# Patient Record
Sex: MALE | Race: OTHER | HISPANIC OR LATINO | URBAN - METROPOLITAN AREA
[De-identification: names, ages, dates, MRNs, and addresses within clinical notes are randomized per-mention and may not be internally consistent; named-entity substitution may affect disease eponyms.]

---

## 2017-07-11 ENCOUNTER — INPATIENT (INPATIENT)
Facility: HOSPITAL | Age: 62
LOS: 8 days | Discharge: NURSING, REHAB OR LONG-TERM CARE | DRG: 291 | End: 2017-07-20
Attending: INTERNAL MEDICINE | Admitting: INTERNAL MEDICINE
Payer: COMMERCIAL

## 2017-07-11 VITALS
RESPIRATION RATE: 22 BRPM | HEART RATE: 143 BPM | WEIGHT: 210.98 LBS | DIASTOLIC BLOOD PRESSURE: 145 MMHG | SYSTOLIC BLOOD PRESSURE: 189 MMHG | OXYGEN SATURATION: 100 % | TEMPERATURE: 99 F

## 2017-07-11 DIAGNOSIS — N17.9 ACUTE KIDNEY FAILURE, UNSPECIFIED: ICD-10-CM

## 2017-07-11 LAB
ALBUMIN SERPL ELPH-MCNC: 4.4 G/DL — SIGNIFICANT CHANGE UP (ref 3.3–5)
ALP SERPL-CCNC: 97 U/L — SIGNIFICANT CHANGE UP (ref 40–120)
ALT FLD-CCNC: 95 U/L — HIGH (ref 10–45)
ANION GAP SERPL CALC-SCNC: 24 MMOL/L — HIGH (ref 5–17)
APTT BLD: 30.3 SEC — SIGNIFICANT CHANGE UP (ref 27.5–37.4)
AST SERPL-CCNC: 193 U/L — HIGH (ref 10–40)
BASOPHILS NFR BLD AUTO: 0.1 % — SIGNIFICANT CHANGE UP (ref 0–2)
BILIRUB SERPL-MCNC: 1.5 MG/DL — HIGH (ref 0.2–1.2)
BUN SERPL-MCNC: 19 MG/DL — SIGNIFICANT CHANGE UP (ref 7–23)
CALCIUM SERPL-MCNC: 9.5 MG/DL — SIGNIFICANT CHANGE UP (ref 8.4–10.5)
CHLORIDE SERPL-SCNC: 101 MMOL/L — SIGNIFICANT CHANGE UP (ref 96–108)
CK MB CFR SERPL CALC: 10.9 NG/ML — HIGH (ref 0–6.7)
CK SERPL-CCNC: 604 U/L — HIGH (ref 30–200)
CO2 SERPL-SCNC: 18 MMOL/L — LOW (ref 22–31)
CREAT SERPL-MCNC: 1.4 MG/DL — HIGH (ref 0.5–1.3)
ETHANOL SERPL-MCNC: <10 MG/DL — SIGNIFICANT CHANGE UP (ref 0–10)
GLUCOSE SERPL-MCNC: 198 MG/DL — HIGH (ref 70–99)
HCT VFR BLD CALC: 40 % — SIGNIFICANT CHANGE UP (ref 39–50)
HGB BLD-MCNC: 12.7 G/DL — LOW (ref 13–17)
INR BLD: 0.99 — SIGNIFICANT CHANGE UP (ref 0.88–1.16)
LIDOCAIN IGE QN: 57 U/L — SIGNIFICANT CHANGE UP (ref 7–60)
LYMPHOCYTES # BLD AUTO: 7.7 % — LOW (ref 13–44)
MAGNESIUM SERPL-MCNC: 1.4 MG/DL — LOW (ref 1.6–2.6)
MCHC RBC-ENTMCNC: 30 PG — SIGNIFICANT CHANGE UP (ref 27–34)
MCHC RBC-ENTMCNC: 31.8 G/DL — LOW (ref 32–36)
MCV RBC AUTO: 94.3 FL — SIGNIFICANT CHANGE UP (ref 80–100)
MONOCYTES NFR BLD AUTO: 9.8 % — SIGNIFICANT CHANGE UP (ref 2–14)
NEUTROPHILS NFR BLD AUTO: 82.4 % — HIGH (ref 43–77)
NT-PROBNP SERPL-SCNC: 4033 PG/ML — HIGH (ref 0–300)
PLATELET # BLD AUTO: 143 K/UL — LOW (ref 150–400)
POTASSIUM SERPL-MCNC: 5 MMOL/L — SIGNIFICANT CHANGE UP (ref 3.5–5.3)
POTASSIUM SERPL-SCNC: 5 MMOL/L — SIGNIFICANT CHANGE UP (ref 3.5–5.3)
PROT SERPL-MCNC: 8.5 G/DL — HIGH (ref 6–8.3)
PROTHROM AB SERPL-ACNC: 11 SEC — SIGNIFICANT CHANGE UP (ref 9.8–12.7)
RBC # BLD: 4.24 M/UL — SIGNIFICANT CHANGE UP (ref 4.2–5.8)
RBC # FLD: 16.7 % — SIGNIFICANT CHANGE UP (ref 10.3–16.9)
SODIUM SERPL-SCNC: 143 MMOL/L — SIGNIFICANT CHANGE UP (ref 135–145)
TROPONIN T SERPL-MCNC: 0.04 NG/ML — HIGH (ref 0–0.01)
WBC # BLD: 8 K/UL — SIGNIFICANT CHANGE UP (ref 3.8–10.5)
WBC # FLD AUTO: 8 K/UL — SIGNIFICANT CHANGE UP (ref 3.8–10.5)

## 2017-07-11 PROCEDURE — 93010 ELECTROCARDIOGRAM REPORT: CPT | Mod: 59

## 2017-07-11 PROCEDURE — 99291 CRITICAL CARE FIRST HOUR: CPT | Mod: 25

## 2017-07-11 PROCEDURE — 71010: CPT | Mod: 26

## 2017-07-11 PROCEDURE — 99291 CRITICAL CARE FIRST HOUR: CPT

## 2017-07-11 RX ORDER — METOPROLOL TARTRATE 50 MG
5 TABLET ORAL
Qty: 0 | Refills: 0 | Status: COMPLETED | OUTPATIENT
Start: 2017-07-11 | End: 2017-07-11

## 2017-07-11 RX ORDER — ONDANSETRON 8 MG/1
4 TABLET, FILM COATED ORAL ONCE
Qty: 0 | Refills: 0 | Status: COMPLETED | OUTPATIENT
Start: 2017-07-11 | End: 2017-07-11

## 2017-07-11 RX ORDER — SODIUM CHLORIDE 9 MG/ML
1000 INJECTION INTRAMUSCULAR; INTRAVENOUS; SUBCUTANEOUS ONCE
Qty: 0 | Refills: 0 | Status: COMPLETED | OUTPATIENT
Start: 2017-07-11 | End: 2017-07-11

## 2017-07-11 RX ORDER — SODIUM CHLORIDE 9 MG/ML
3 INJECTION INTRAMUSCULAR; INTRAVENOUS; SUBCUTANEOUS ONCE
Qty: 0 | Refills: 0 | Status: COMPLETED | OUTPATIENT
Start: 2017-07-11 | End: 2017-07-11

## 2017-07-11 RX ORDER — MAGNESIUM SULFATE 500 MG/ML
2 VIAL (ML) INJECTION ONCE
Qty: 0 | Refills: 0 | Status: COMPLETED | OUTPATIENT
Start: 2017-07-11 | End: 2017-07-11

## 2017-07-11 RX ORDER — ASPIRIN/CALCIUM CARB/MAGNESIUM 324 MG
325 TABLET ORAL ONCE
Qty: 0 | Refills: 0 | Status: COMPLETED | OUTPATIENT
Start: 2017-07-11 | End: 2017-07-11

## 2017-07-11 RX ORDER — SODIUM CHLORIDE 9 MG/ML
1000 INJECTION, SOLUTION INTRAVENOUS
Qty: 0 | Refills: 0 | Status: DISCONTINUED | OUTPATIENT
Start: 2017-07-11 | End: 2017-07-12

## 2017-07-11 RX ORDER — FUROSEMIDE 40 MG
40 TABLET ORAL ONCE
Qty: 0 | Refills: 0 | Status: COMPLETED | OUTPATIENT
Start: 2017-07-11 | End: 2017-07-11

## 2017-07-11 RX ADMIN — Medication 2 MILLIGRAM(S): at 20:53

## 2017-07-11 RX ADMIN — SODIUM CHLORIDE 200 MILLILITER(S): 9 INJECTION, SOLUTION INTRAVENOUS at 22:30

## 2017-07-11 RX ADMIN — ONDANSETRON 4 MILLIGRAM(S): 8 TABLET, FILM COATED ORAL at 20:57

## 2017-07-11 RX ADMIN — Medication 5 MILLIGRAM(S): at 22:10

## 2017-07-11 RX ADMIN — SODIUM CHLORIDE 200 MILLILITER(S): 9 INJECTION, SOLUTION INTRAVENOUS at 21:33

## 2017-07-11 RX ADMIN — SODIUM CHLORIDE 200 MILLILITER(S): 9 INJECTION, SOLUTION INTRAVENOUS at 21:41

## 2017-07-11 RX ADMIN — SODIUM CHLORIDE 200 MILLILITER(S): 9 INJECTION, SOLUTION INTRAVENOUS at 22:51

## 2017-07-11 RX ADMIN — SODIUM CHLORIDE 1000 MILLILITER(S): 9 INJECTION INTRAMUSCULAR; INTRAVENOUS; SUBCUTANEOUS at 20:58

## 2017-07-11 RX ADMIN — Medication 50 GRAM(S): at 22:28

## 2017-07-11 RX ADMIN — SODIUM CHLORIDE 200 MILLILITER(S): 9 INJECTION, SOLUTION INTRAVENOUS at 21:49

## 2017-07-11 RX ADMIN — SODIUM CHLORIDE 200 MILLILITER(S): 9 INJECTION, SOLUTION INTRAVENOUS at 20:54

## 2017-07-11 RX ADMIN — Medication 50 MILLIGRAM(S): at 23:49

## 2017-07-11 RX ADMIN — SODIUM CHLORIDE 3 MILLILITER(S): 9 INJECTION INTRAMUSCULAR; INTRAVENOUS; SUBCUTANEOUS at 20:54

## 2017-07-11 RX ADMIN — Medication 40 MILLIGRAM(S): at 21:35

## 2017-07-11 RX ADMIN — SODIUM CHLORIDE 200 MILLILITER(S): 9 INJECTION, SOLUTION INTRAVENOUS at 22:11

## 2017-07-11 RX ADMIN — Medication 5 MILLIGRAM(S): at 20:56

## 2017-07-11 RX ADMIN — Medication 325 MILLIGRAM(S): at 22:28

## 2017-07-11 RX ADMIN — SODIUM CHLORIDE 200 MILLILITER(S): 9 INJECTION, SOLUTION INTRAVENOUS at 22:20

## 2017-07-11 RX ADMIN — Medication 2 MILLIGRAM(S): at 21:33

## 2017-07-11 RX ADMIN — Medication 2 MILLIGRAM(S): at 23:49

## 2017-07-11 RX ADMIN — SODIUM CHLORIDE 200 MILLILITER(S): 9 INJECTION, SOLUTION INTRAVENOUS at 22:56

## 2017-07-11 NOTE — CONSULT NOTE ADULT - SUBJECTIVE AND OBJECTIVE BOX
HPI: 61 year old M poor historian w/ PMH of CHF, HTN, atrial fibrillation s/p ablation, HLD, EtOH abuse presenting to [facility] ED w/ SOB. Patient states he ran out of his medications this past Sunday and started to feel short of breath last night. He has worsening dyspnea on exertion. He also has been drinking a pint of vodka for the last 4 years daily. His last drink was last night. He denies a history of alcohol withdrawal seizures and is unsure if he has ever been intubated. He currently denies any nausea or vomiting, has some anxiety, denies hallucinations. In the ED, T 99,  (sinus) --> 107, /145 --> 137/88, RR 22, Sat 100%. In the ED, he was given 4 mg zofran, lopressor 5 mg IVP, , Mag 2 gm, lasix 40 mg, ativan 2 mg x2, and 1L NS bolus.     PMH:  CHF (EF unknown)  Atrial fibrillation  HTN  HLD    PSxH:  A fib ablation    A: none    Meds:  Lasix 80 mg daily  Lisinopril 5 mg daily  Metoprolol 200 mg daily  Amiodarone 200 mg daily    VITAL SIGNS:  Vital Signs Last 24 Hrs  T(C): 37.2 (11 Jul 2017 20:25), Max: 37.2 (11 Jul 2017 20:25)  T(F): 99 (11 Jul 2017 20:25), Max: 99 (11 Jul 2017 20:25)  HR: 116 (11 Jul 2017 22:28) (116 - 143)  BP: 137/88 (11 Jul 2017 22:19) (132/72 - 189/145)  BP(mean): --  RR: 19 (11 Jul 2017 22:19) (19 - 22)  SpO2: 100% (11 Jul 2017 22:28) (100% - 100%)    PHYSICAL EXAM:    General: obese, on BIPAP, appears comfortable, speaking in full sentences, diaphoretic  HEENT: NC/AT; no tongue fasciculations  Neck: supple  Cardiovascular: tachycardic, S1/S2; no m/r/g  Respiratory: crackles at the R base, otherwise clear  Gastrointestinal: soft, NT, distended;   Extremities: WWP; no edema, clubbing or cyanosis, mild bilateral hand tremor  Vascular: 2+ radial, DP/PT pulses b/l  Neurological: AAOx3; no focal deficits    MEDICATIONS:  MEDICATIONS  (STANDING):  sodium chloride 0.9% 1000 milliLiter(s) (200 mL/Hr) IV Continuous <Continuous>  LORazepam   Injectable 2 milliGRAM(s) IV Push Once  chlordiazePOXIDE 50 milliGRAM(s) Oral Once      LABS:                        12.7   8.0   )-----------( 143      ( 11 Jul 2017 20:58 )             40.0     07-11    143  |  101  |  19  ----------------------------<  198<H>  5.0   |  18<L>  |  1.40<H>    Ca    9.5      11 Jul 2017 20:58  Mg     1.4     07-11    TPro  8.5<H>  /  Alb  4.4  /  TBili  1.5<H>  /  DBili  x   /  AST  193<H>  /  ALT  95<H>  /  AlkPhos  97  07-11    PT/INR - ( 11 Jul 2017 20:58 )   PT: 11.0 sec;   INR: 0.99          PTT - ( 11 Jul 2017 20:58 )  PTT:30.3 sec    CAPILLARY BLOOD GLUCOSE          RADIOLOGY & ADDITIONAL TESTS: Reviewed.    ASSESSMENT:  61 year old M poor historian w/ PMH of CHF, HTN, atrial fibrillation s/p ablation, HLD, EtOH abuse presenting to [facility] ED w/ SOB and alcohol withdrawal.     PLAN:     1. Alcohol withdrawal: CIWA of 6 (diaphoretic, anxiety, s/p ativan 2 mg x2, can start with librium 75 mg and valium 10 mg IVP  -continue to monitor CIWA  -has never been intubated    2. Tachycardia: w/ sinus rhythm on EKG  -etiology likely secondary to alcohol withdrawal  -HR has come down to 100s from 143 since admission  -tachycardia will likely resolve with control of alcohol withdrawal    3. SOB: likely secondary to CHF exacerbation  -s/p lasix 40 mg in ED w/ good urine output  -currently on BIPAP  -with elevated BNP as well  -will need to obtain more collateral in AM    Dispo: HPI: 61 year old M poor historian w/ PMH of CHF, HTN, atrial fibrillation s/p ablation, HLD, EtOH abuse presenting to [facility] ED w/ SOB. Patient states he ran out of his medications this past Sunday and started to feel short of breath last night. He has worsening dyspnea on exertion. He also has been drinking a pint of vodka for the last 4 years daily. His last drink was last night. He denies a history of alcohol withdrawal seizures and is unsure if he has ever been intubated. He currently denies any nausea or vomiting, has some anxiety, denies hallucinations. In the ED, T 99,  (sinus) --> 107, /145 --> 137/88, RR 22, Sat 100%. In the ED, he was given 4 mg zofran, lopressor 5 mg IVP, , Mag 2 gm, lasix 40 mg, ativan 2 mg x2, and 1L NS bolus.     PMH:  CHF (EF unknown)  Atrial fibrillation  HTN  HLD    PSxH:  A fib ablation    A: none    Meds:  Lasix 80 mg daily  Lisinopril 5 mg daily  Metoprolol 200 mg daily  Amiodarone 200 mg daily    VITAL SIGNS:  Vital Signs Last 24 Hrs  T(C): 37.2 (11 Jul 2017 20:25), Max: 37.2 (11 Jul 2017 20:25)  T(F): 99 (11 Jul 2017 20:25), Max: 99 (11 Jul 2017 20:25)  HR: 116 (11 Jul 2017 22:28) (116 - 143)  BP: 137/88 (11 Jul 2017 22:19) (132/72 - 189/145)  BP(mean): --  RR: 19 (11 Jul 2017 22:19) (19 - 22)  SpO2: 100% (11 Jul 2017 22:28) (100% - 100%)    PHYSICAL EXAM:    General: obese, on BIPAP, appears comfortable, speaking in full sentences, diaphoretic  HEENT: NC/AT; no tongue fasciculations  Neck: supple  Cardiovascular: tachycardic, S1/S2; no m/r/g  Respiratory: crackles at the R base, otherwise clear  Gastrointestinal: soft, NT, distended;   Extremities: WWP; no edema, clubbing or cyanosis, mild bilateral hand tremor  Vascular: 2+ radial, DP/PT pulses b/l  Neurological: AAOx3; no focal deficits    MEDICATIONS:  MEDICATIONS  (STANDING):  sodium chloride 0.9% 1000 milliLiter(s) (200 mL/Hr) IV Continuous <Continuous>  LORazepam   Injectable 2 milliGRAM(s) IV Push Once  chlordiazePOXIDE 50 milliGRAM(s) Oral Once      LABS:                        12.7   8.0   )-----------( 143      ( 11 Jul 2017 20:58 )             40.0     07-11    143  |  101  |  19  ----------------------------<  198<H>  5.0   |  18<L>  |  1.40<H>    Ca    9.5      11 Jul 2017 20:58  Mg     1.4     07-11    TPro  8.5<H>  /  Alb  4.4  /  TBili  1.5<H>  /  DBili  x   /  AST  193<H>  /  ALT  95<H>  /  AlkPhos  97  07-11    PT/INR - ( 11 Jul 2017 20:58 )   PT: 11.0 sec;   INR: 0.99          PTT - ( 11 Jul 2017 20:58 )  PTT:30.3 sec    CAPILLARY BLOOD GLUCOSE          RADIOLOGY & ADDITIONAL TESTS: Reviewed.    ASSESSMENT:  61 year old M poor historian w/ PMH of CHF, HTN, atrial fibrillation s/p ablation, HLD, EtOH abuse presenting to [facility] ED w/ SOB and alcohol withdrawal.     PLAN:     1. Alcohol withdrawal: CIWA of 6 (diaphoretic, anxiety, s/p ativan 2 mg x2, can start with librium 75 mg and valium 10 mg IVP  -continue to monitor CIWA  -has never been intubated    2. Tachycardia: w/ sinus rhythm on EKG  -etiology likely secondary to alcohol withdrawal  -HR has come down to 100s from 143 since admission  -tachycardia will likely resolve with control of alcohol withdrawal    3. SOB: likely secondary to CHF exacerbation  -s/p lasix 40 mg in ED w/ good urine output  -currently on BIPAP  -with elevated BNP as well  -will need to obtain more collateral in AM    4. Anion gap metabolic acidosis: w/ Cr 1.4, unclear baseline  -would check lactate    5. Elevated troponin:  -EKG w/ sinus rhythm  -no prior EKG  -would trend troponins  -likely demand from alcohol withdrawal and possible CHF exacerbation    Dispo: HPI: 61 year old M poor historian w/ PMH of CHF, HTN, atrial fibrillation s/p ablation, HLD, EtOH abuse presenting to [facility] ED w/ SOB. Patient states he ran out of his medications this past Sunday and started to feel short of breath last night. He has worsening dyspnea on exertion. He also has been drinking a pint of vodka for the last 4 years daily. His last drink was last night. He denies a history of alcohol withdrawal seizures and is unsure if he has ever been intubated. He currently denies any nausea or vomiting, has some anxiety, denies hallucinations. In the ED, T 99,  (sinus) --> 107, /145 --> 137/88, RR 22, Sat 100%. In the ED, he was given 4 mg zofran, lopressor 5 mg IVP, , Mag 2 gm, lasix 40 mg, ativan 2 mg x2, and 1L NS bolus.     PMH:  CHF (EF unknown)  Atrial fibrillation  HTN  HLD    PSxH:  A fib ablation    A: none    Meds:  Lasix 80 mg daily  Lisinopril 5 mg daily  Metoprolol 200 mg daily  Amiodarone 200 mg daily    VITAL SIGNS:  Vital Signs Last 24 Hrs  T(C): 37.2 (11 Jul 2017 20:25), Max: 37.2 (11 Jul 2017 20:25)  T(F): 99 (11 Jul 2017 20:25), Max: 99 (11 Jul 2017 20:25)  HR: 116 (11 Jul 2017 22:28) (116 - 143)  BP: 137/88 (11 Jul 2017 22:19) (132/72 - 189/145)  BP(mean): --  RR: 19 (11 Jul 2017 22:19) (19 - 22)  SpO2: 100% (11 Jul 2017 22:28) (100% - 100%)    PHYSICAL EXAM:    General: obese, on BIPAP, appears comfortable, speaking in full sentences, diaphoretic  HEENT: NC/AT; no tongue fasciculations  Neck: supple  Cardiovascular: tachycardic, S1/S2; no m/r/g  Respiratory: crackles at the R base, otherwise clear  Gastrointestinal: soft, NT, distended;   Extremities: WWP; no edema, clubbing or cyanosis, mild bilateral hand tremor  Vascular: 2+ radial, DP/PT pulses b/l  Neurological: AAOx3; no focal deficits    MEDICATIONS:  MEDICATIONS  (STANDING):  sodium chloride 0.9% 1000 milliLiter(s) (200 mL/Hr) IV Continuous <Continuous>  LORazepam   Injectable 2 milliGRAM(s) IV Push Once  chlordiazePOXIDE 50 milliGRAM(s) Oral Once      LABS:                        12.7   8.0   )-----------( 143      ( 11 Jul 2017 20:58 )             40.0     07-11    143  |  101  |  19  ----------------------------<  198<H>  5.0   |  18<L>  |  1.40<H>    Ca    9.5      11 Jul 2017 20:58  Mg     1.4     07-11    TPro  8.5<H>  /  Alb  4.4  /  TBili  1.5<H>  /  DBili  x   /  AST  193<H>  /  ALT  95<H>  /  AlkPhos  97  07-11    PT/INR - ( 11 Jul 2017 20:58 )   PT: 11.0 sec;   INR: 0.99          PTT - ( 11 Jul 2017 20:58 )  PTT:30.3 sec    CAPILLARY BLOOD GLUCOSE          RADIOLOGY & ADDITIONAL TESTS: Reviewed.    ASSESSMENT:  61 year old M poor historian w/ PMH of CHF, HTN, atrial fibrillation s/p ablation, HLD, EtOH abuse presenting to [facility] ED w/ SOB and alcohol withdrawal.     PLAN:     1. Alcohol withdrawal: CIWA of 6 (diaphoretic, anxiety, s/p ativan 2 mg x2, can start with librium 75 mg and valium 10 mg IVP  -continue to monitor CIWA  -has never been intubated  -c/w librium and prn ativan    2. Tachycardia: w/ sinus rhythm on EKG  -etiology likely secondary to alcohol withdrawal  -HR has come down to 100s from 143 since admission  -tachycardia will likely resolve with control of alcohol withdrawal  -repeat EKG on arrival to [facility] to r/o NSTEMI    3. SOB: likely secondary to CHF exacerbation  -s/p lasix 40 mg in ED w/ good urine output  -currently on BIPAP  -with elevated BNP as well  -will need to obtain more collateral in AM    4. Anion gap metabolic acidosis: w/ Cr 1.4, unclear baseline  -would check lactate    5. Elevated troponin:  -EKG w/ sinus rhythm, repeat EKG upon arrival to [facility] to r/o NSTEMI  -no prior EKG  -would trend troponins      Dispo: Admit to [facility]

## 2017-07-11 NOTE — ED PROVIDER NOTE - NEUROLOGICAL, MLM
Alert and oriented, no focal deficits, no motor or sensory deficits.  tremor of arms when extended, tongue fasiculations

## 2017-07-11 NOTE — ED PROVIDER NOTE - CONSTITUTIONAL, MLM
normal... diaphoretic, ill appearing, well nourished, awake, alert, oriented to person, place, time/situation and in moderate apparent distress.

## 2017-07-11 NOTE — ED PROVIDER NOTE - MEDICAL DECISION MAKING DETAILS
diaphoretic, tachycardic, hypertensive, tremulous.  concern for alcohol withdrawal with overlaping symptoms of htn urgency/chf due to med non compliance.  given ativan 2mg iv push x3, metoprolol 5mg iv, iv fluid bolus.  initially did not appear volume overloaded but after labs returned with bnp > 4000 and history of chf, concern for worsening respiratory distress secondary from fluid overload.  given lasix and started bipap with improvement.  icu consulted.  initial troponin mildly elevated.  discussed starting heparin with icu attending Dr. Maciel who would like to trend troponin prior to starting.  also requested additional 75mg librium, 10mg valium.  admit to [facility]

## 2017-07-11 NOTE — ED PROVIDER NOTE - OBJECTIVE STATEMENT
here with shakiness, nausea, shortness of breath, fast hr.  States he ran out of his medicines a few days ago (furosemide 80mg, lisinopril 5mg, metoprolol er 200, amiodarone 200mg).  Has been drinking daily a pint of vodka, last drink yesterday.  Denies prior alcohol withdrawal or dt's/ seizure.  No chest pain, fever, vomiting.

## 2017-07-11 NOTE — ED PROVIDER NOTE - CARE PLAN
Principal Discharge DX:	Alcohol withdrawal  Secondary Diagnosis:	Elevated troponin  Secondary Diagnosis:	Shortness of breath

## 2017-07-11 NOTE — ED ADULT NURSE NOTE - OBJECTIVE STATEMENT
pt brought self to er with c/o SOB that gets worst with walking. pt stated that he also drinks Alcohol with last drink  last night and he normally drink 1 pint of Vodka daily. pt was noted to be very diaphoretic in er and extensive shakiness. able to maintain full sentences and answer all questions asked

## 2017-07-11 NOTE — ED PROVIDER NOTE - PMH
Congestive heart failure  CHF (congestive heart failure)  Essential hypertension  HTN (hypertension)

## 2017-07-11 NOTE — ED ADULT TRIAGE NOTE - CHIEF COMPLAINT QUOTE
pt complaining of SOB since this afternoon with "shaking". Pt hypertensive and tachycardiac in triage, denies CP. EKG in prog

## 2017-07-12 DIAGNOSIS — E78.5 HYPERLIPIDEMIA, UNSPECIFIED: ICD-10-CM

## 2017-07-12 DIAGNOSIS — R06.02 SHORTNESS OF BREATH: ICD-10-CM

## 2017-07-12 DIAGNOSIS — Z29.9 ENCOUNTER FOR PROPHYLACTIC MEASURES, UNSPECIFIED: ICD-10-CM

## 2017-07-12 DIAGNOSIS — I48.91 UNSPECIFIED ATRIAL FIBRILLATION: ICD-10-CM

## 2017-07-12 DIAGNOSIS — I50.9 HEART FAILURE, UNSPECIFIED: ICD-10-CM

## 2017-07-12 DIAGNOSIS — F10.239 ALCOHOL DEPENDENCE WITH WITHDRAWAL, UNSPECIFIED: ICD-10-CM

## 2017-07-12 DIAGNOSIS — R63.8 OTHER SYMPTOMS AND SIGNS CONCERNING FOOD AND FLUID INTAKE: ICD-10-CM

## 2017-07-12 DIAGNOSIS — R74.8 ABNORMAL LEVELS OF OTHER SERUM ENZYMES: ICD-10-CM

## 2017-07-12 DIAGNOSIS — I48.92 UNSPECIFIED ATRIAL FLUTTER: ICD-10-CM

## 2017-07-12 DIAGNOSIS — E87.2 ACIDOSIS: ICD-10-CM

## 2017-07-12 DIAGNOSIS — I10 ESSENTIAL (PRIMARY) HYPERTENSION: ICD-10-CM

## 2017-07-12 LAB
ALBUMIN SERPL ELPH-MCNC: 4 G/DL — SIGNIFICANT CHANGE UP (ref 3.3–5)
ALP SERPL-CCNC: 79 U/L — SIGNIFICANT CHANGE UP (ref 40–120)
ALT FLD-CCNC: 73 U/L — HIGH (ref 10–45)
ANION GAP SERPL CALC-SCNC: 19 MMOL/L — HIGH (ref 5–17)
ANION GAP SERPL CALC-SCNC: 22 MMOL/L — HIGH (ref 5–17)
APPEARANCE UR: CLEAR — SIGNIFICANT CHANGE UP
APTT BLD: 27.4 SEC — LOW (ref 27.5–37.4)
AST SERPL-CCNC: 98 U/L — HIGH (ref 10–40)
BACTERIA # UR AUTO: PRESENT /HPF
BILIRUB SERPL-MCNC: 1.3 MG/DL — HIGH (ref 0.2–1.2)
BILIRUB UR-MCNC: (no result)
BUN SERPL-MCNC: 18 MG/DL — SIGNIFICANT CHANGE UP (ref 7–23)
BUN SERPL-MCNC: 22 MG/DL — SIGNIFICANT CHANGE UP (ref 7–23)
CALCIUM SERPL-MCNC: 8.7 MG/DL — SIGNIFICANT CHANGE UP (ref 8.4–10.5)
CALCIUM SERPL-MCNC: 9.3 MG/DL — SIGNIFICANT CHANGE UP (ref 8.4–10.5)
CHLORIDE SERPL-SCNC: 105 MMOL/L — SIGNIFICANT CHANGE UP (ref 96–108)
CHLORIDE SERPL-SCNC: 105 MMOL/L — SIGNIFICANT CHANGE UP (ref 96–108)
CK MB CFR SERPL CALC: 8.3 NG/ML — HIGH (ref 0–6.7)
CK SERPL-CCNC: 456 U/L — HIGH (ref 30–200)
CO2 SERPL-SCNC: 16 MMOL/L — LOW (ref 22–31)
CO2 SERPL-SCNC: 19 MMOL/L — LOW (ref 22–31)
COLOR SPEC: YELLOW — SIGNIFICANT CHANGE UP
COMMENT - URINE: SIGNIFICANT CHANGE UP
CREAT ?TM UR-MCNC: 151 MG/DL — SIGNIFICANT CHANGE UP
CREAT SERPL-MCNC: 1.3 MG/DL — SIGNIFICANT CHANGE UP (ref 0.5–1.3)
CREAT SERPL-MCNC: 1.4 MG/DL — HIGH (ref 0.5–1.3)
DIFF PNL FLD: (no result)
EPI CELLS # UR: SIGNIFICANT CHANGE UP /HPF
GAS PNL BLDA: SIGNIFICANT CHANGE UP
GLUCOSE SERPL-MCNC: 101 MG/DL — HIGH (ref 70–99)
GLUCOSE SERPL-MCNC: 111 MG/DL — HIGH (ref 70–99)
GLUCOSE UR QL: NEGATIVE — SIGNIFICANT CHANGE UP
HCT VFR BLD CALC: 36.1 % — LOW (ref 39–50)
HGB BLD-MCNC: 11.7 G/DL — LOW (ref 13–17)
INR BLD: 1.04 — SIGNIFICANT CHANGE UP (ref 0.88–1.16)
KETONES UR-MCNC: NEGATIVE — SIGNIFICANT CHANGE UP
LACTATE SERPL-SCNC: 1.1 MMOL/L — SIGNIFICANT CHANGE UP (ref 0.5–2)
LEUKOCYTE ESTERASE UR-ACNC: NEGATIVE — SIGNIFICANT CHANGE UP
MAGNESIUM SERPL-MCNC: 3 MG/DL — HIGH (ref 1.6–2.6)
MCHC RBC-ENTMCNC: 31 PG — SIGNIFICANT CHANGE UP (ref 27–34)
MCHC RBC-ENTMCNC: 32.4 G/DL — SIGNIFICANT CHANGE UP (ref 32–36)
MCV RBC AUTO: 95.5 FL — SIGNIFICANT CHANGE UP (ref 80–100)
NITRITE UR-MCNC: NEGATIVE — SIGNIFICANT CHANGE UP
PCO2 BLDA: SIGNIFICANT CHANGE UP MMHG (ref 35–48)
PH BLDA: SIGNIFICANT CHANGE UP (ref 7.35–7.45)
PH UR: 5.5 — SIGNIFICANT CHANGE UP (ref 5–8)
PHOSPHATE SERPL-MCNC: 3.2 MG/DL — SIGNIFICANT CHANGE UP (ref 2.5–4.5)
PLATELET # BLD AUTO: 114 K/UL — LOW (ref 150–400)
PO2 BLDA: SIGNIFICANT CHANGE UP MMHG (ref 83–108)
POTASSIUM SERPL-MCNC: 4 MMOL/L — SIGNIFICANT CHANGE UP (ref 3.5–5.3)
POTASSIUM SERPL-MCNC: 4.3 MMOL/L — SIGNIFICANT CHANGE UP (ref 3.5–5.3)
POTASSIUM SERPL-SCNC: 4 MMOL/L — SIGNIFICANT CHANGE UP (ref 3.5–5.3)
POTASSIUM SERPL-SCNC: 4.3 MMOL/L — SIGNIFICANT CHANGE UP (ref 3.5–5.3)
PROT SERPL-MCNC: 7.1 G/DL — SIGNIFICANT CHANGE UP (ref 6–8.3)
PROT UR-MCNC: 30 MG/DL
PROTHROM AB SERPL-ACNC: 11.5 SEC — SIGNIFICANT CHANGE UP (ref 9.8–12.7)
RBC # BLD: 3.78 M/UL — LOW (ref 4.2–5.8)
RBC # FLD: 17.1 % — HIGH (ref 10.3–16.9)
RBC CASTS # UR COMP ASSIST: > 10 /HPF
SAO2 % BLDA: SIGNIFICANT CHANGE UP % (ref 95–100)
SODIUM SERPL-SCNC: 143 MMOL/L — SIGNIFICANT CHANGE UP (ref 135–145)
SODIUM SERPL-SCNC: 143 MMOL/L — SIGNIFICANT CHANGE UP (ref 135–145)
SP GR SPEC: 1.02 — SIGNIFICANT CHANGE UP (ref 1–1.03)
TROPONIN T SERPL-MCNC: 0.03 NG/ML — HIGH (ref 0–0.01)
UROBILINOGEN FLD QL: 1 E.U./DL — SIGNIFICANT CHANGE UP
UUN UR-MCNC: 396 MG/DL — SIGNIFICANT CHANGE UP
WBC # BLD: 5.8 K/UL — SIGNIFICANT CHANGE UP (ref 3.8–10.5)
WBC # FLD AUTO: 5.8 K/UL — SIGNIFICANT CHANGE UP (ref 3.8–10.5)
WBC UR QL: < 5 /HPF — SIGNIFICANT CHANGE UP

## 2017-07-12 PROCEDURE — 99222 1ST HOSP IP/OBS MODERATE 55: CPT | Mod: GC

## 2017-07-12 PROCEDURE — 93010 ELECTROCARDIOGRAM REPORT: CPT

## 2017-07-12 RX ORDER — METOPROLOL TARTRATE 50 MG
5 TABLET ORAL ONCE
Qty: 0 | Refills: 0 | Status: COMPLETED | OUTPATIENT
Start: 2017-07-12 | End: 2017-07-12

## 2017-07-12 RX ORDER — FUROSEMIDE 40 MG
80 TABLET ORAL DAILY
Qty: 0 | Refills: 0 | Status: DISCONTINUED | OUTPATIENT
Start: 2017-07-12 | End: 2017-07-12

## 2017-07-12 RX ORDER — AMIODARONE HYDROCHLORIDE 400 MG/1
200 TABLET ORAL
Qty: 0 | Refills: 0 | Status: DISCONTINUED | OUTPATIENT
Start: 2017-07-12 | End: 2017-07-13

## 2017-07-12 RX ORDER — DIGOXIN 250 MCG
0.25 TABLET ORAL EVERY 6 HOURS
Qty: 0 | Refills: 0 | Status: DISCONTINUED | OUTPATIENT
Start: 2017-07-13 | End: 2017-07-13

## 2017-07-12 RX ORDER — SODIUM CHLORIDE 9 MG/ML
1000 INJECTION, SOLUTION INTRAVENOUS ONCE
Qty: 0 | Refills: 0 | Status: DISCONTINUED | OUTPATIENT
Start: 2017-07-12 | End: 2017-07-12

## 2017-07-12 RX ORDER — RIVAROXABAN 15 MG-20MG
20 KIT ORAL DAILY
Qty: 0 | Refills: 0 | Status: DISCONTINUED | OUTPATIENT
Start: 2017-07-12 | End: 2017-07-16

## 2017-07-12 RX ORDER — FUROSEMIDE 40 MG
40 TABLET ORAL
Qty: 0 | Refills: 0 | Status: DISCONTINUED | OUTPATIENT
Start: 2017-07-13 | End: 2017-07-15

## 2017-07-12 RX ORDER — LISINOPRIL 2.5 MG/1
5 TABLET ORAL DAILY
Qty: 0 | Refills: 0 | Status: DISCONTINUED | OUTPATIENT
Start: 2017-07-12 | End: 2017-07-14

## 2017-07-12 RX ORDER — FUROSEMIDE 40 MG
40 TABLET ORAL ONCE
Qty: 0 | Refills: 0 | Status: COMPLETED | OUTPATIENT
Start: 2017-07-12 | End: 2017-07-12

## 2017-07-12 RX ORDER — AMIODARONE HYDROCHLORIDE 400 MG/1
200 TABLET ORAL DAILY
Qty: 0 | Refills: 0 | Status: DISCONTINUED | OUTPATIENT
Start: 2017-07-12 | End: 2017-07-12

## 2017-07-12 RX ORDER — METOPROLOL TARTRATE 50 MG
200 TABLET ORAL DAILY
Qty: 0 | Refills: 0 | Status: DISCONTINUED | OUTPATIENT
Start: 2017-07-12 | End: 2017-07-13

## 2017-07-12 RX ORDER — RIVAROXABAN 15 MG-20MG
20 KIT ORAL DAILY
Qty: 0 | Refills: 0 | Status: DISCONTINUED | OUTPATIENT
Start: 2017-07-12 | End: 2017-07-12

## 2017-07-12 RX ORDER — MAGNESIUM SULFATE 500 MG/ML
4 VIAL (ML) INJECTION ONCE
Qty: 0 | Refills: 0 | Status: COMPLETED | OUTPATIENT
Start: 2017-07-12 | End: 2017-07-12

## 2017-07-12 RX ORDER — DIGOXIN 250 MCG
0.5 TABLET ORAL ONCE
Qty: 0 | Refills: 0 | Status: COMPLETED | OUTPATIENT
Start: 2017-07-12 | End: 2017-07-12

## 2017-07-12 RX ADMIN — Medication 75 MILLIGRAM(S): at 21:18

## 2017-07-12 RX ADMIN — Medication 75 MILLIGRAM(S): at 09:19

## 2017-07-12 RX ADMIN — AMIODARONE HYDROCHLORIDE 200 MILLIGRAM(S): 400 TABLET ORAL at 06:53

## 2017-07-12 RX ADMIN — Medication 2 MILLIGRAM(S): at 19:07

## 2017-07-12 RX ADMIN — Medication 75 MILLIGRAM(S): at 03:24

## 2017-07-12 RX ADMIN — Medication 5 MILLIGRAM(S): at 21:33

## 2017-07-12 RX ADMIN — Medication 2 MILLIGRAM(S): at 23:11

## 2017-07-12 RX ADMIN — Medication 100 GRAM(S): at 09:11

## 2017-07-12 RX ADMIN — RIVAROXABAN 20 MILLIGRAM(S): KIT at 17:07

## 2017-07-12 RX ADMIN — LISINOPRIL 5 MILLIGRAM(S): 2.5 TABLET ORAL at 10:42

## 2017-07-12 RX ADMIN — Medication 2 MILLIGRAM(S): at 20:44

## 2017-07-12 RX ADMIN — Medication 2 MILLIGRAM(S): at 13:00

## 2017-07-12 RX ADMIN — Medication 200 MILLIGRAM(S): at 04:27

## 2017-07-12 RX ADMIN — Medication 5 MILLIGRAM(S): at 20:39

## 2017-07-12 RX ADMIN — Medication 75 MILLIGRAM(S): at 17:40

## 2017-07-12 RX ADMIN — Medication 208 MILLIGRAM(S): at 23:08

## 2017-07-12 RX ADMIN — AMIODARONE HYDROCHLORIDE 200 MILLIGRAM(S): 400 TABLET ORAL at 19:06

## 2017-07-12 RX ADMIN — Medication 80 MILLIGRAM(S): at 17:07

## 2017-07-12 RX ADMIN — SODIUM CHLORIDE 200 MILLILITER(S): 9 INJECTION, SOLUTION INTRAVENOUS at 01:03

## 2017-07-12 RX ADMIN — Medication 5 MILLIGRAM(S): at 21:18

## 2017-07-12 RX ADMIN — Medication 40 MILLIGRAM(S): at 19:05

## 2017-07-12 NOTE — H&P ADULT - PROBLEM SELECTOR PLAN 1
CIWA of 15 s/p ativan 2 mg x3, librium 50 mg and valium 10 mg IV.  Continue to monitor CIWA.  Librium 75mg q6 hours, Ativan 4mg q4 PRN.

## 2017-07-12 NOTE — H&P ADULT - PMH
Atrial fibrillation, unspecified type    Congestive heart failure  CHF (congestive heart failure)  Essential hypertension  HTN (hypertension)  HLD (hyperlipidemia)

## 2017-07-12 NOTE — PROGRESS NOTE ADULT - PROBLEM SELECTOR PLAN 4
CIWA of 15 s/p ativan 2 mg x3, librium 50 mg and valium 10 mg IV.  Continue to monitor CIWA.  Librium 75mg q6 hours, Ativan 4mg q4 PRN. CIWA of 15 s/p ativan 2 mg x3, librium 50 mg and valium 10 mg IV. Anxiety and symptoms may be from cardiac issues.   -Continue to monitor CIWA.    -Librium 75mg q8 hours, Ativan 2mg q4 PRN. Anxiety and symptoms may be from cardiac issues.   -Continue to monitor CIWA.    -Librium 75mg q8 hours, Ativan 2mg q4 PRN.

## 2017-07-12 NOTE — H&P ADULT - PROBLEM SELECTOR PLAN 3
Troponin elevated at 0.04 most likely due supply demand mismatch caused by afib with RVR, less likely non-ST elevation MI.  EKG WNL. Will trend troponin.

## 2017-07-12 NOTE — H&P ADULT - PROBLEM SELECTOR PLAN 4
Pt with a history of afib ?s/p ablation presented with sinus tachycardia but found on exam to have an irregularly irregular rhythm.  S/p Lopressor 10mg for HR control.  Continue home metoprolol 200mg.  Continue home amiodarone 200 mg. CHADsVASC2 score is 2, suggesting moderate-high risk. Consider starting anticoagulation. Anion gap metabolic acidosis possibly secondary to EtOH ketoacidosis vs elevated lactate.  f/u Lactate  f/u ABG

## 2017-07-12 NOTE — H&P ADULT - PROBLEM SELECTOR PLAN 2
Shortness of breath secondary to afib with RVR vs. CHF exacerbation (elevated BNP.) S/p Lasix 40 mg in ED w/ good urine output. PE unlikely (Wells score <4, PE unlikely).  Pneumonia less likely as there is no leukocytosis, fever, or suggestive exam findings. CXR negative for any acute pathology.  F/u ABG for Shortness of breath secondary to afib with RVR vs. CHF exacerbation (elevated BNP.) S/p Lasix 40 mg in ED w/ good urine output. PE unlikely (Wells score <4, PE unlikely).  Pneumonia less likely as there is no leukocytosis, fever, or suggestive exam findings. CXR negative for any acute pathology.  f/u ABG

## 2017-07-12 NOTE — PROGRESS NOTE ADULT - PROBLEM SELECTOR PLAN 7
Hold lisinopril due to mild ADAM Cr 1.4>>1.3. Pt normotensive. Consider restarting in AM. restarted lisinopril 5mg

## 2017-07-12 NOTE — PROGRESS NOTE ADULT - PROBLEM SELECTOR PLAN 2
History of CHF, EF unknown. BNP 4033.  Echo today. Repeat EKG. History of CHF, EF unknown. BNP 4033.   -Echo today  -Repeat EKG. managed by EP Dr. Kristofer Roberts, will come see patient today  -holding Xarelto 200mg for anticoagulation until Dr. Roberts sees patient today managed by EP Dr. Kristofer Roberts, will come see patient today  -holding Xarelto 200mg for anticoagulation until Dr. Roberts sees patient today  -repeat EKG this morning showed a fib in 110's with RVR managed by EP Dr. Kristofer Roberts, will come see patient today  -Xarelto 200mg for anticoagulation per Dr. Sheth  -repeat EKG this morning showed a fib in 110's with RVR managed by EP Dr. Kristofer Roberts, will come see patient today  -amiodarone 200mg  -Xarelto 200mg for anticoagulation per Dr. Sheth  -repeat EKG this morning showed a fib in 110's with RVR, another ekg this evening

## 2017-07-12 NOTE — PROGRESS NOTE ADULT - PROBLEM SELECTOR PLAN 6
Pt with a history of afib ?s/p ablation presented with sinus tachycardia but found on exam to have an irregularly irregular rhythm.  S/p Lopressor 10mg for HR control.  Continue home metoprolol 200mg.  Continue home amiodarone 200 mg. CHADsVASC2 score is 2, suggesting moderate-high risk. Consider starting anticoagulation. Anion gap metabolic acidosis possibly secondary to EtOH ketoacidosis vs elevated lactate.  -lactate 1.1   -consider ABG

## 2017-07-12 NOTE — H&P ADULT - HISTORY OF PRESENT ILLNESS
61 year old M poor historian w/ PMH of CHF, HTN, atrial fibrillation s/p ablation, HLD, EtOH abuse presenting to [facility] ED w/ SOB. Patient states he ran out of his medications this past Sunday and started to feel short of breath last night. He has worsening dyspnea on exertion. He also has been drinking a pint of vodka for the last 4 years daily. His last drink was last night. He denies a history of alcohol withdrawal seizures and is unsure if he has ever been intubated. He currently denies any nausea or vomiting, has some anxiety, denies hallucinations.     In the ED, T 99,  (sinus) --> 107, /145 --> 137/88, RR 22, Sat 100%. In the ED, he was given 4 mg zofran, lopressor 5 mg IVP, , Mag 2 gm, lasix 40 mg, ativan 2 mg x2, and 1L NS bolus. 61 year old M w/ PMH of CHF, HTN, atrial fibrillation s/p ablation, HLD, EtOH abuse presented to [facility] ED w/ SOB. Patient states that he ran out of his medications this past Sunday and started to feel short of breath last night. He complains of worsening dyspnea on exertion, stating that he used to be able to walk 20-30 yards before becoming SOB and not tires out much sooner. He had a similar episode of SOB in February for which he was seen at [facility] and diagnosed with a heart failure exacerbation. He also has been drinking a pint of vodka for the last 4 years daily. His last drink was 8PM Monday evening. He denies a history of alcohol withdrawal seizures and is unsure if he has ever been intubated. He currently denies any CP, nausea, vomiting, abd pain, headache, or hallucinations, but states that he feels very anxious.     In the ED, Tmax 99,  - 143 (sinus) 107, /77 - 189/145, RR 18 - 22, Sat 97% - 100%. In the ED, he was given 4 mg Zofran, Lopressor 5 mg IVP, , Mag 2 gm, Lasix 40 mg, Aivan 2 mg x3, and 1L NS banana bag x2, Librium 50mg and Valium 10mg.

## 2017-07-12 NOTE — H&P ADULT - PROBLEM SELECTOR PLAN 5
History of CHF, EF unknown. BNP 4033. Pt with a history of afib ?s/p ablation presented with sinus tachycardia but found on exam to have an irregularly irregular rhythm.  S/p Lopressor 10mg for HR control.  Continue home metoprolol 200mg.  Continue home amiodarone 200 mg. CHADsVASC2 score is 2, suggesting moderate-high risk. Consider starting anticoagulation.

## 2017-07-12 NOTE — H&P ADULT - NSHPPHYSICALEXAM_GEN_ALL_CORE
VITAL SIGNS:  T(C): 37 (07-12-17 @ 00:14), Max: 37.2 (07-11-17 @ 20:25)  T(F): 98.6 (07-12-17 @ 00:14), Max: 99 (07-11-17 @ 20:25)  HR: 100 (07-12-17 @ 02:14) (100 - 143)  BP: 118/77 (07-12-17 @ 02:14) (118/77 - 189/145)  BP(mean): --  RR: 18 (07-12-17 @ 02:14) (18 - 22)  SpO2: 97% (07-12-17 @ 02:14) (97% - 100%)  Wt(kg): --    PHYSICAL EXAM:    Constitutional: obese male with poor hygiene, diaphoretic and mildly distressed  Head: NC/AT  Eyes: PERRL, EOMI, clear conjunctiva  ENT: no nasal discharge; uvula midline, no oropharyngeal erythema or exudates; MMM  Neck: supple; no JVD or thyromegaly  Respiratory: CTA B/L; no W/R/R, no retractions  Cardiac: +S1/S2; irregular rhythm; no M/R/G  Gastrointestinal: soft, non-tender; no rebound or guarding; +BSx4  Back: spine midline, no bony tenderness  Extremities: WWP, no clubbing or cyanosis; trace   Musculoskeletal: NROM x4; no joint swelling, tenderness or erythema  Vascular: 2+ radial, femoral, DP/PT pulses B/L  Dermatologic: skin warm, dry and intact; no rashes, wounds, or scars  Lymphatic: no submandibular or cervical LAD  Neurologic: AAOx3; CNII-XII grossly intact; no focal deficits  Psychiatric: affect and characteristics of appearance, verbalizations, behaviors are appropriate VITAL SIGNS:  T(C): 37 (07-12-17 @ 00:14), Max: 37.2 (07-11-17 @ 20:25)  T(F): 98.6 (07-12-17 @ 00:14), Max: 99 (07-11-17 @ 20:25)  HR: 100 (07-12-17 @ 02:14) (100 - 143)  BP: 118/77 (07-12-17 @ 02:14) (118/77 - 189/145)  BP(mean): --  RR: 18 (07-12-17 @ 02:14) (18 - 22)  SpO2: 97% (07-12-17 @ 02:14) (97% - 100%)  Wt(kg): --    PHYSICAL EXAM:    Constitutional: obese male with poor hygiene, diaphoretic and mildly distressed  Head: NC/AT  Eyes: PERRL, EOMI, clear conjunctiva  ENT: no nasal discharge; uvula midline, no oropharyngeal erythema or exudates; MMM  Neck: supple; no JVD or thyromegaly  Respiratory: CTA B/L; no W/R/R, RR 20, unable to speak in full sentences  Cardiac: +S1/S2; irregular rhythm; no M/R/G  Gastrointestinal: soft, non-tender; no rebound or guarding; +BSx4  Back: spine midline, no bony tenderness  Extremities: WWP, no clubbing or cyanosis; trace   Musculoskeletal: NROM x4; no joint swelling, tenderness or erythema  Vascular: 2+ radial, DP pulses B/L  Dermatologic: skin warm, dry and intact; no rashes  Lymphatic: no submandibular or cervical LAD  Neurologic: AAOx3; CNII-XII grossly intact; no focal deficits

## 2017-07-12 NOTE — PROGRESS NOTE ADULT - PROBLEM SELECTOR PLAN 1
Shortness of breath secondary to 2/2afib with RVR vs. CHF exacerbation (elevated BNP.) S/p Lasix 40 mg in ED w/ good urine output. PE unlikely (Wells score <4, PE unlikely).  Pneumonia less likely as there is no leukocytosis, fever, or suggestive exam findings. CXR negative for any acute pathology.  f/u ABG Shortness of breath- likely cardiac origin: 2/2 afib with RVR vs. CHF exacerbation (elevated BNP.)   -Will speak to Cardiologist Graciela Melchor -EP, seek opinion on lasix   -metoprolol 200mg once a day  -lisinopril 5mg once a day    S/p Lasix 40 mg in ED w/ good urine output. PE unlikely (Wells score <4, PE unlikely).  Pneumonia less likely as there is no leukocytosis, fever, or suggestive exam findings. CXR negative for any acute pathology.  f/u ABG Shortness of breath- likely cardiac origin: 2/2 afib with RVR vs. CHF exacerbation (elevated BNP.), aspiration pneumonia unlikely Pneumonia less likely as there is no leukocytosis, fever, exam findings and negative CXR  -spoke to Cardiologist Dr. Roberts and EP Dr. Sheth- will come see patient today  -Graciela -EP, seek opinion on lasix, received 40 lasix in ED with good urine output   -metoprolol 200mg once a day  -lisinopril 5mg once a day  Pneumonia less likely as there is no leukocytosis, fever, or suggestive exam findings. CXR negative for any acute pathology.  S/p Lasix 40 mg in ED w/ good urine output. PE unlikely (Wells score <4, PE unlikely).  Pneumonia less likely as there is no leukocytosis, fever, or suggestive exam findings. CXR negative for any acute pathology.  f/u ABG Shortness of breath- likely cardiac origin: 2/2 afib with RVR vs. CHF exacerbation (elevated BNP.), aspiration pneumonia unlikely Pneumonia less likely as there is no leukocytosis, fever, exam findings and negative CXR, PE unlikely- Wells score <4, PE unlikely  -spoke to Cardiologist Dr. Roberts and EP Dr. Sheht- will come see patient today  -Graciela -EP, received 40 lasix in ED with good urine output   -metoprolol 200mg once a day  -lisinopril 5mg once a day (home dose 2.5mg)   -holding home lasix 80mg Shortness of breath- likely cardiac origin: 2/2 afib with RVR vs. CHF exacerbation (elevated BNP.), aspiration pneumonia unlikely Pneumonia less likely as there is no leukocytosis, fever, exam findings and negative CXR, PE unlikely- Wells score <4, PE unlikely  -spoke to Cardiologist Dr. Roberts and EP Dr. Sheth- will come see patient today  -Graciela -EP, received 40 lasix in ED with good urine output   -obtain LE doppler to rule out DVT   -metoprolol 200mg once a day  -lisinopril 5mg once a day (home dose 2.5mg)   -holding home lasix 80mg Shortness of breath- likely cardiac origin: 2/2 afib with RVR vs. CHF exacerbation (elevated BNP.), aspiration pneumonia unlikely Pneumonia less likely as there is no leukocytosis, fever, exam findings and negative CXR, PE unlikely- Wells score <4, PE unlikely  -spoke to Cardiologist Dr. Roberts and EP Dr. Sheth- will come see patient today  -Graciela -EP, received 40 lasix in ED with good urine output   -obtain LE doppler to rule out DVT   -metoprolol 200mg once a day  -lisinopril 5mg once a day (home dose 2.5mg)   -holding home lasix 80mg  -TTE Shortness of breath- likely cardiac origin: 2/2 afib with RVR vs. CHF exacerbation (elevated BNP.), aspiration pneumonia unlikely Pneumonia less likely as there is no leukocytosis, fever, exam findings and negative CXR, PE unlikely- Wells score <4, PE unlikely  -spoke to Cardiologist Dr. Roberts and EP Dr. Sheth- will come see patient today  -Graciela -EP, received 40 lasix in ED with good urine output   -obtain LE doppler to rule out DVT   -metoprolol 200mg once a day  -lisinopril 5mg once a day (home dose 2.5mg)   -continue home lasix 80mg  -echo Shortness of breath- likely cardiac origin: 2/2 afib with RVR vs. CHF exacerbation (elevated BNP.), aspiration pneumonia unlikely Pneumonia less likely as there is no leukocytosis, fever, exam findings and negative CXR, PE unlikely- Wells score <4, PE unlikely  -spoke to Cardiologist Dr. Roberts and EP Dr. Sheth- will come see patient today  -Graciela -EP, received 40 lasix in ED with good urine output   -obtain LE doppler to rule out DVT   -metoprolol 200mg once a day  -lisinopril 5mg once a day (home dose 2.5mg)   -increase to lasix 40mg BID, increased from 80mg oral at home   -echo  -

## 2017-07-12 NOTE — H&P ADULT - NSHPLABSRESULTS_GEN_ALL_CORE
.  LABS:                         12.7   8.0   )-----------( 143      ( 11 Jul 2017 20:58 )             40.0     07-12    143  |  105  |  18  ----------------------------<  111<H>  4.3   |  16<L>  |  1.30    Ca    8.7      12 Jul 2017 03:09  Mg     1.4     07-11    TPro  7.1  /  Alb  4.0  /  TBili  1.3<H>  /  DBili  0.3<H>  /  AST  98<H>  /  ALT  73<H>  /  AlkPhos  79  07-12    PT/INR - ( 11 Jul 2017 20:58 )   PT: 11.0 sec;   INR: 0.99          PTT - ( 11 Jul 2017 20:58 )  PTT:30.3 sec    CARDIAC MARKERS ( 11 Jul 2017 20:58 )  x     / 0.04 ng/mL / 604 U/L / x     / 10.9 ng/mL      Serum Pro-Brain Natriuretic Peptide: 4033 pg/mL (07-11 @ 20:58)    RADIOLOGY, EKG & ADDITIONAL TESTS: Reviewed.

## 2017-07-12 NOTE — CHART NOTE - NSCHARTNOTEFT_GEN_A_CORE
Called for Rate Control recommendations.    61 year old M w/ PMH of CHF (EF 15-20), HTN, atrial fibrillation s/p ablation, HLD, EtOH abuse presented to [facility] ED w/ SOB  No in rapid A-flutter, received Metoprolol XL 200mg and IV 5mg pushes x3 with limited Rate control  BP stable, SBPs in 140-160s  On Amio PO and Xarelto, patient had not been taking medications as an outpatient    Recs  - At this time would favor Digoxin load for better rate control, would avoid Amio IV due to risk of CV and patient has not been anticoagulated appropriately prior to presentation  - Digoxin Loadin.25mg IV Q8H for 3 doses  - Plan for LAKEISHA in AM, keep NPO after midnight    Case was discussed with Dr Higuera (EP)

## 2017-07-12 NOTE — H&P ADULT - PROBLEM SELECTOR PLAN 9
FEN: NS with T/F/MV @200cc/hr, replete electrolytes as necessary, NPO pending morning labs    FULL CODE

## 2017-07-12 NOTE — PROGRESS NOTE ADULT - PROBLEM SELECTOR PLAN 5
Anion gap metabolic acidosis possibly secondary to EtOH ketoacidosis vs elevated lactate.  f/u Lactate  f/u ABG Troponin elevated at 0.04   -downtrended to 0.03

## 2017-07-12 NOTE — PROGRESS NOTE ADULT - PROBLEM SELECTOR PLAN 3
Troponin elevated at 0.04 most likely due supply demand mismatch caused by afib with RVR, less likely non-ST elevation MI.  EKG WNL. Will trend troponin. Troponin elevated at 0.04 most likely due supply demand mismatch caused by afib with RVR, less likely non-ST elevation MI.  EKG WNL.   -downtrended to 0.03 History of CHF, EF unknown. BNP 4033.   -Echo today  -f/u EKG read History of CHF, EF unknown. BNP 4033.   -Echo today  -repeat EKG shows a-fib with RVR (7/12)

## 2017-07-12 NOTE — CONSULT NOTE ADULT - SUBJECTIVE AND OBJECTIVE BOX
CHIEF COMPLAINT:    HISTORY OF PRESENT ILLNESS:    61 year old M poor historian w/ PMH of CHF, HTN, atrial fibrillation s/p ablation, HLD, EtOH abuse presenting to [facility] ED w/ SOB. Patient states he ran out of his medications this past Sunday and started to feel short of breath last night. He has worsening dyspnea on exertion. He also has been drinking a pint of vodka for the last 4 years daily. His last drink was last night. He denies a history of alcohol withdrawal seizures and is unsure if he has ever been intubated. He currently denies any nausea or vomiting, has some anxiety, denies hallucinations. In the ED, T 99,  (sinus) --> 107, /145 --> 137/88, RR 22, Sat 100%. In the ED, he was given 4 mg zofran, lopressor 5 mg IVP, , Mag 2 gm, lasix 40 mg, ativan 2 mg x2, and 1L NS bolus.     MY ASSESSMENT TODAY:  Anxious, Jittery  c/o palpitations  some SOB  Admits to non compliance with medications  Recent AF ablation with Dr. Roberts      PAST MEDICAL & SURGICAL HISTORY:  HLD (hyperlipidemia)  Atrial fibrillation, unspecified type  Congestive heart failure: CHF (congestive heart failure) - h/o Tachy induced cardiomyopathy  Essential hypertension: HTN (hypertension)  No significant past surgical history        PERTINENT DIAGNOSTIC TESTING:    [ ] Echocardiogram:      Allergies    No Known Allergies    Intolerances    	    MEDICATIONS:  metoprolol succinate  milliGRAM(s) Oral daily  amiodarone    Tablet 200 milliGRAM(s) Oral daily  lisinopril 5 milliGRAM(s) Oral daily  furosemide    Tablet 80 milliGRAM(s) Oral daily  LORazepam   Injectable 2 milliGRAM(s) IV Push every 4 hours PRN  chlordiazePOXIDE 75 milliGRAM(s) Oral every 8 hours  rivaroxaban 20 milliGRAM(s) Oral daily      FAMILY HISTORY:  No pertinent family history in first degree relatives      SOCIAL HISTORY:    [ ] Non-smoker  [ ] Smoker  [ ] Alcohol        REVIEW OF SYSTEMS:    CONSTITUTIONAL: No fever, weight loss, or fatigue  EYES: No eye pain, visual disturbances, or discharge  ENMT:  No difficulty hearing, tinnitus, vertigo; No sinus or throat pain  NECK: No pain or stiffness  BREASTS: No pain, masses, or nipple discharge  RESPIRATORY: No cough, wheezing, chills or hemoptysis; No Shortness of Breath  CARDIOVASCULAR: No chest pain, palpitations, dizziness, or leg swelling  GASTROINTESTINAL: No abdominal or epigastric pain. No nausea, vomiting, or hematemesis; No diarrhea or constipation. No melena or hematochezia.  GENITOURINARY: No dysuria, frequency, hematuria, or incontinence  NEUROLOGICAL: No headaches, memory loss, loss of strength, numbness, or tremors  SKIN: No itching, burning, rashes, or lesions   LYMPH Nodes: No enlarged glands  ENDOCRINE: No heat or cold intolerance; No hair loss  MUSCULOSKELETAL: No joint pain or swelling; No muscle, back, or extremity pain  PSYCHIATRIC: No depression, anxiety, mood swings, or difficulty sleeping  HEME/LYMPH: No easy bruising, or bleeding gums  ALLERY AND IMMUNOLOGIC: No hives or eczema	    [ ] All others negative	  [ ] Unable to obtain    PHYSICAL EXAM:  T(C): 37 (07-12-17 @ 18:05), Max: 37.2 (07-11-17 @ 20:25)  HR: 121 (07-12-17 @ 16:55) (100 - 143)  BP: 120/98 (07-12-17 @ 16:55) (118/77 - 189/145)  RR: 16 (07-12-17 @ 16:55) (16 - 22)  SpO2: 97% (07-12-17 @ 16:55) (97% - 100%)  Wt(kg): --  I&O's Summary    12 Jul 2017 07:01  -  12 Jul 2017 18:41  --------------------------------------------------------  IN: 860 mL / OUT: 300 mL / NET: 560 mL        TELEMETRY: 	    ECG: Typical atrial flutter with 2:1 conduction    Appearance: Normal	  HEENT:   Normal oral mucosa, PERRL, EOMI	  Cardiovascular: Normal S1 S2, No JVD, No murmurs, No edema  Respiratory: Lungs clear to auscultation	  Gastrointestinal:  Soft, Non-tender, + BS	  Neurologic: A&O x 3, Non-focal  Extremities: Normal range of motion, No clubbing, cyanosis or edema  Vascular: Peripheral pulses palpable 2+ bilaterally    	  LABS:	 	    CARDIAC MARKERS:                         11.7   5.8   )-----------( 114      ( 12 Jul 2017 11:34 )             36.1     07-12    143  |  105  |  22  ----------------------------<  101<H>  4.0   |  19<L>  |  1.40<H>    Ca    9.3      12 Jul 2017 11:34  Phos  3.2     07-12  Mg     3.0     07-12    TPro  7.1  /  Alb  4.0  /  TBili  1.3<H>  /  DBili  0.3<H>  /  AST  98<H>  /  ALT  73<H>  /  AlkPhos  79  07-12    proBNP: Serum Pro-Brain Natriuretic Peptide: 4033 pg/mL (07-11 @ 20:58)    Lipid Profile:   HgA1c:   TSH:     ASSESSMENT/PLAN: 	    61 year old M poor historian w/ PMH of CHF, HTN, atrial fibrillation s/p AF ablation with acute decompensated systolic CHF secondary to atrial flutter with rapid rates, non compliance and ? alcohol withdrawal    RECOMMEND    - Diurese  - resume AC  - Rate control with Amiodarone  - May need LAKEISHA with DCCV as the first step  - Long term: AFL ablation  - Monitor and treat for alcohol withdrawl  - Evaulation for alcoholism and non compliance    Discussed with 7 L team, Isela Roberts and Meryl

## 2017-07-12 NOTE — PROGRESS NOTE ADULT - PROBLEM SELECTOR PLAN 9
FEN: NS with T/F/MV @200cc/hr, replete electrolytes as necessary, NPO pending morning labs    FULL CODE - replete electrolytes as necessary,   -Dash Diet

## 2017-07-12 NOTE — H&P ADULT - ASSESSMENT
61 year old M w/ PMH of CHF, HTN, atrial fibrillation s/p ablation, HLD, EtOH abuse presented to [facility] ED w/ medication noncompliance x3 days and SOB x1 day most likely secondary to EtOH withdrawal .

## 2017-07-12 NOTE — PROGRESS NOTE ADULT - ASSESSMENT
61 year old M w/ PMH of CHF, HTN, atrial fibrillation s/p ablation, HLD, EtOH abuse presented to [facility] ED w/ medication noncompliance x3 days and SOB x1 day most likely secondary to EtOH withdrawal . 61 year old M w/ PMH of CHF, HTN, atrial fibrillation s/p ablation, HLD, EtOH abuse presented to [facility] ED w/ medication noncompliance x3 days and SOB x1 day most likely cardiac in origin

## 2017-07-12 NOTE — PROGRESS NOTE ADULT - SUBJECTIVE AND OBJECTIVE BOX
INTERVAL HPI/OVERNIGHT EVENTS: Admitted Overnight     SUBJECTIVE: patient complains of SOB and anxiety and chronic knee pain.     ROS:  CV: Denies chest pain  Resp: + SOB  GI: Denies abdominal pain, constipation, diarrhea, nausea, vomiting  : Denies dysuria  ID: Denies fevers, chills  MSK: + knee pain     OBJECTIVE:    VITAL SIGNS:  ICU Vital Signs Last 24 Hrs  T(C): 36.2 (2017 05:26), Max: 37.2 (2017 20:25)  T(F): 97.2 (2017 05:26), Max: 99 (2017 20:25)  HR: 117 (2017 09:23) (100 - 143)  BP: 146/101 (2017 09:23) (118/77 - 189/145)  BP(mean): 124 (2017 03:40) (124 - 124)  ABP: --  ABP(mean): --  RR: 17 (2017 09:23) (16 - 22)  SpO2: 97% (2017 09:23) (97% - 100%)         @ 07:01  -  -12 @ 10:52  --------------------------------------------------------  IN: 50 mL / OUT: 0 mL / NET: 50 mL      CAPILLARY BLOOD GLUCOSE  169 (2017 05:26)          PHYSICAL EXAM:    General: diaphoretic, writhing, anxious   HEENT: NCAT, PERRL, clear conjunctiva, no scleral icterus  Neck: supple, no JVD  Respiratory: CTA b/l, no wheezing, rhonchi, rales  Cardiovascular: RRR, normal S1S2, no M/R/G  Abdomen: soft, NT/ND, bowel sounds in all four quadrants, no palpable masses  Extremities: WWP, no clubbing, cyanosis, or edema  Neuro: grossly intact     MEDICATIONS:  MEDICATIONS  (STANDING):  metoprolol succinate  milliGRAM(s) Oral daily  amiodarone    Tablet 200 milliGRAM(s) Oral daily  chlordiazePOXIDE 75 milliGRAM(s) Oral every 8 hours  lisinopril 5 milliGRAM(s) Oral daily    MEDICATIONS  (PRN):  LORazepam   Injectable 2 milliGRAM(s) IV Push every 4 hours PRN Agitation/anxiety      ALLERGIES:  Allergies    No Known Allergies    Intolerances        LABS:                        12.7   8.0   )-----------( 143      ( 2017 20:58 )             40.0         143  |  105  |  18  ----------------------------<  111<H>  4.3   |  16<L>  |  1.30    Ca    8.7      2017 03:09  Mg     1.4         TPro  7.1  /  Alb  4.0  /  TBili  1.3<H>  /  DBili  0.3<H>  /  AST  98<H>  /  ALT  73<H>  /  AlkPhos  79  12    PT/INR - ( 2017 04:57 )   PT: 11.5 sec;   INR: 1.04          PTT - ( 2017 04:57 )  PTT:27.4 sec  Urinalysis Basic - ( 2017 05:29 )    Color: Yellow / Appearance: Clear / S.020 / pH: x  Gluc: x / Ketone: NEGATIVE  / Bili: Small / Urobili: 1.0 E.U./dL   Blood: x / Protein: 30 mg/dL / Nitrite: NEGATIVE   Leuk Esterase: NEGATIVE / RBC: > 10 /HPF / WBC < 5 /HPF   Sq Epi: x / Non Sq Epi: Few /HPF / Bacteria: Present /HPF        RADIOLOGY & ADDITIONAL TESTS:

## 2017-07-13 DIAGNOSIS — F10.20 ALCOHOL DEPENDENCE, UNCOMPLICATED: ICD-10-CM

## 2017-07-13 DIAGNOSIS — R41.0 DISORIENTATION, UNSPECIFIED: ICD-10-CM

## 2017-07-13 LAB
ALBUMIN SERPL ELPH-MCNC: 3.7 G/DL — SIGNIFICANT CHANGE UP (ref 3.3–5)
ALBUMIN SERPL ELPH-MCNC: 3.7 G/DL — SIGNIFICANT CHANGE UP (ref 3.3–5)
ALP SERPL-CCNC: 90 U/L — SIGNIFICANT CHANGE UP (ref 40–120)
ALP SERPL-CCNC: 91 U/L — SIGNIFICANT CHANGE UP (ref 40–120)
ALT FLD-CCNC: 111 U/L — HIGH (ref 10–45)
ALT FLD-CCNC: 99 U/L — HIGH (ref 10–45)
ANION GAP SERPL CALC-SCNC: 17 MMOL/L — SIGNIFICANT CHANGE UP (ref 5–17)
ANION GAP SERPL CALC-SCNC: 17 MMOL/L — SIGNIFICANT CHANGE UP (ref 5–17)
AST SERPL-CCNC: 109 U/L — HIGH (ref 10–40)
AST SERPL-CCNC: 176 U/L — HIGH (ref 10–40)
BILIRUB SERPL-MCNC: 1.4 MG/DL — HIGH (ref 0.2–1.2)
BILIRUB SERPL-MCNC: 1.7 MG/DL — HIGH (ref 0.2–1.2)
BUN SERPL-MCNC: 25 MG/DL — HIGH (ref 7–23)
BUN SERPL-MCNC: 26 MG/DL — HIGH (ref 7–23)
CALCIUM SERPL-MCNC: 9 MG/DL — SIGNIFICANT CHANGE UP (ref 8.4–10.5)
CALCIUM SERPL-MCNC: 9 MG/DL — SIGNIFICANT CHANGE UP (ref 8.4–10.5)
CHLORIDE SERPL-SCNC: 104 MMOL/L — SIGNIFICANT CHANGE UP (ref 96–108)
CHLORIDE SERPL-SCNC: 104 MMOL/L — SIGNIFICANT CHANGE UP (ref 96–108)
CO2 SERPL-SCNC: 20 MMOL/L — LOW (ref 22–31)
CO2 SERPL-SCNC: 23 MMOL/L — SIGNIFICANT CHANGE UP (ref 22–31)
CREAT SERPL-MCNC: 1.7 MG/DL — HIGH (ref 0.5–1.3)
CREAT SERPL-MCNC: 1.7 MG/DL — HIGH (ref 0.5–1.3)
GLUCOSE SERPL-MCNC: 100 MG/DL — HIGH (ref 70–99)
GLUCOSE SERPL-MCNC: 102 MG/DL — HIGH (ref 70–99)
HCT VFR BLD CALC: 37 % — LOW (ref 39–50)
HGB BLD-MCNC: 11.8 G/DL — LOW (ref 13–17)
MAGNESIUM SERPL-MCNC: 2 MG/DL — SIGNIFICANT CHANGE UP (ref 1.6–2.6)
MCHC RBC-ENTMCNC: 30.4 PG — SIGNIFICANT CHANGE UP (ref 27–34)
MCHC RBC-ENTMCNC: 31.9 G/DL — LOW (ref 32–36)
MCV RBC AUTO: 95.4 FL — SIGNIFICANT CHANGE UP (ref 80–100)
PHOSPHATE SERPL-MCNC: 2.9 MG/DL — SIGNIFICANT CHANGE UP (ref 2.5–4.5)
PLATELET # BLD AUTO: 125 K/UL — LOW (ref 150–400)
POTASSIUM SERPL-MCNC: 3.8 MMOL/L — SIGNIFICANT CHANGE UP (ref 3.5–5.3)
POTASSIUM SERPL-MCNC: 3.8 MMOL/L — SIGNIFICANT CHANGE UP (ref 3.5–5.3)
POTASSIUM SERPL-SCNC: 3.8 MMOL/L — SIGNIFICANT CHANGE UP (ref 3.5–5.3)
POTASSIUM SERPL-SCNC: 3.8 MMOL/L — SIGNIFICANT CHANGE UP (ref 3.5–5.3)
PROT SERPL-MCNC: 7 G/DL — SIGNIFICANT CHANGE UP (ref 6–8.3)
PROT SERPL-MCNC: 7.2 G/DL — SIGNIFICANT CHANGE UP (ref 6–8.3)
RBC # BLD: 3.88 M/UL — LOW (ref 4.2–5.8)
RBC # FLD: 16 % — SIGNIFICANT CHANGE UP (ref 10.3–16.9)
SODIUM SERPL-SCNC: 141 MMOL/L — SIGNIFICANT CHANGE UP (ref 135–145)
SODIUM SERPL-SCNC: 144 MMOL/L — SIGNIFICANT CHANGE UP (ref 135–145)
WBC # BLD: 7.1 K/UL — SIGNIFICANT CHANGE UP (ref 3.8–10.5)
WBC # FLD AUTO: 7.1 K/UL — SIGNIFICANT CHANGE UP (ref 3.8–10.5)

## 2017-07-13 PROCEDURE — 71010: CPT | Mod: 26

## 2017-07-13 PROCEDURE — 93010 ELECTROCARDIOGRAM REPORT: CPT

## 2017-07-13 PROCEDURE — 99233 SBSQ HOSP IP/OBS HIGH 50: CPT | Mod: GC

## 2017-07-13 PROCEDURE — 99223 1ST HOSP IP/OBS HIGH 75: CPT

## 2017-07-13 RX ORDER — POTASSIUM CHLORIDE 20 MEQ
10 PACKET (EA) ORAL
Qty: 0 | Refills: 0 | Status: COMPLETED | OUTPATIENT
Start: 2017-07-13 | End: 2017-07-13

## 2017-07-13 RX ORDER — AMIODARONE HYDROCHLORIDE 400 MG/1
0.5 TABLET ORAL
Qty: 900 | Refills: 0 | Status: DISCONTINUED | OUTPATIENT
Start: 2017-07-13 | End: 2017-07-14

## 2017-07-13 RX ORDER — AMIODARONE HYDROCHLORIDE 400 MG/1
200 TABLET ORAL EVERY 12 HOURS
Qty: 0 | Refills: 0 | Status: DISCONTINUED | OUTPATIENT
Start: 2017-07-13 | End: 2017-07-13

## 2017-07-13 RX ORDER — METOPROLOL TARTRATE 50 MG
50 TABLET ORAL EVERY 6 HOURS
Qty: 0 | Refills: 0 | Status: DISCONTINUED | OUTPATIENT
Start: 2017-07-13 | End: 2017-07-13

## 2017-07-13 RX ORDER — VERAPAMIL HCL 240 MG
5 CAPSULE, EXTENDED RELEASE PELLETS 24 HR ORAL ONCE
Qty: 0 | Refills: 0 | Status: COMPLETED | OUTPATIENT
Start: 2017-07-13 | End: 2017-07-13

## 2017-07-13 RX ORDER — METOPROLOL TARTRATE 50 MG
100 TABLET ORAL
Qty: 0 | Refills: 0 | Status: DISCONTINUED | OUTPATIENT
Start: 2017-07-13 | End: 2017-07-20

## 2017-07-13 RX ORDER — METOPROLOL TARTRATE 50 MG
5 TABLET ORAL ONCE
Qty: 0 | Refills: 0 | Status: COMPLETED | OUTPATIENT
Start: 2017-07-13 | End: 2017-07-13

## 2017-07-13 RX ORDER — AMIODARONE HYDROCHLORIDE 400 MG/1
400 TABLET ORAL ONCE
Qty: 0 | Refills: 0 | Status: COMPLETED | OUTPATIENT
Start: 2017-07-13 | End: 2017-07-13

## 2017-07-13 RX ORDER — AMIODARONE HYDROCHLORIDE 400 MG/1
1 TABLET ORAL
Qty: 900 | Refills: 0 | Status: DISCONTINUED | OUTPATIENT
Start: 2017-07-13 | End: 2017-07-14

## 2017-07-13 RX ORDER — AMIODARONE HYDROCHLORIDE 400 MG/1
150 TABLET ORAL ONCE
Qty: 0 | Refills: 0 | Status: COMPLETED | OUTPATIENT
Start: 2017-07-13 | End: 2017-07-13

## 2017-07-13 RX ORDER — DIGOXIN 250 MCG
0.25 TABLET ORAL ONCE
Qty: 0 | Refills: 0 | Status: COMPLETED | OUTPATIENT
Start: 2017-07-13 | End: 2017-07-13

## 2017-07-13 RX ADMIN — Medication 40 MILLIGRAM(S): at 06:38

## 2017-07-13 RX ADMIN — Medication 2 MILLIGRAM(S): at 16:23

## 2017-07-13 RX ADMIN — LISINOPRIL 5 MILLIGRAM(S): 2.5 TABLET ORAL at 06:38

## 2017-07-13 RX ADMIN — Medication 5 MILLIGRAM(S): at 00:58

## 2017-07-13 RX ADMIN — Medication 200 MILLIGRAM(S): at 06:38

## 2017-07-13 RX ADMIN — Medication 75 MILLIGRAM(S): at 06:38

## 2017-07-13 RX ADMIN — Medication 40 MILLIGRAM(S): at 18:22

## 2017-07-13 RX ADMIN — AMIODARONE HYDROCHLORIDE 600 MILLIGRAM(S): 400 TABLET ORAL at 01:05

## 2017-07-13 RX ADMIN — Medication 2 MILLIGRAM(S): at 01:30

## 2017-07-13 RX ADMIN — AMIODARONE HYDROCHLORIDE 400 MILLIGRAM(S): 400 TABLET ORAL at 22:14

## 2017-07-13 RX ADMIN — Medication 100 MILLIEQUIVALENT(S): at 13:50

## 2017-07-13 RX ADMIN — Medication 204 MILLIGRAM(S): at 06:38

## 2017-07-13 RX ADMIN — Medication 2 MILLIGRAM(S): at 22:41

## 2017-07-13 RX ADMIN — Medication 100 MILLIGRAM(S): at 18:22

## 2017-07-13 RX ADMIN — AMIODARONE HYDROCHLORIDE 33.33 MG/MIN: 400 TABLET ORAL at 01:15

## 2017-07-13 RX ADMIN — Medication 204 MILLIGRAM(S): at 13:51

## 2017-07-13 RX ADMIN — AMIODARONE HYDROCHLORIDE 200 MILLIGRAM(S): 400 TABLET ORAL at 06:38

## 2017-07-13 RX ADMIN — Medication 5 MILLIGRAM(S): at 00:46

## 2017-07-13 RX ADMIN — Medication 75 MILLIGRAM(S): at 13:51

## 2017-07-13 RX ADMIN — RIVAROXABAN 20 MILLIGRAM(S): KIT at 11:26

## 2017-07-13 RX ADMIN — Medication 100 MILLIEQUIVALENT(S): at 11:26

## 2017-07-13 NOTE — SWALLOW BEDSIDE ASSESSMENT ADULT - NS SPL SWALLOW CLINIC TRIAL FT
Pt ate 1 dry cracker and took ~4oz water via self-admin cup sips. Hyolaryngeal excursion appears WFL and swallow trigger is timely to palpation & cervical auscultation. Pt noted to have increased WOB when chewing cracker. He would benefit from aspiration precautions to reduce risk of discoordination between breathing & swallowing, but does not appear to have a mechanical dysphagia.

## 2017-07-13 NOTE — BEHAVIORAL HEALTH ASSESSMENT NOTE - CASE SUMMARY
61M h/o longstanding severe alcohol use disorder presenting with CHF exacerbation and currently being treated for alcohol withdrawal.  Psychiatry consulted to assess for underlying anxiety.  On my interview patient fell asleep repeatedly and was disoriented to place ("school" "[place]").  He reported anxiety largely in setting of housing concerns.  He appeared motivated to engage in inpatient rehab.  Given patient's elevated LFTs and delirious appearance would consider switching from PO Librium to PO Ativan (~8-9 mg total daily dose) and taper dose by no more than 10-20% total daily dose per day.  It was difficult to fully evaluate underlying anxiety at this time; psychiatry will continue to follow.

## 2017-07-13 NOTE — CONSULT NOTE ADULT - SUBJECTIVE AND OBJECTIVE BOX
Patient is a 61y old  Male who presents with a chief complaint of Shortness of Breath (2017 02:11)      HPI:  61 year old M w/ PMH of CHF, HTN, atrial fibrillation s/p ablation, HLD, EtOH abuse presented to [facility] ED w/ SOB. Patient states that he ran out of his medications this past  and started to feel short of breath last night. He complains of worsening dyspnea on exertion, stating that he used to be able to walk 20-30 yards before becoming SOB and not tires out much sooner. He had a similar episode of SOB in February for which he was seen at [facility] and diagnosed with a heart failure exacerbation. He also has been drinking a pint of vodka for the last 4 years daily. His last drink was 8PM Monday evening. He denies a history of alcohol withdrawal seizures and is unsure if he has ever been intubated. He currently denies any CP, nausea, vomiting, abd pain, headache, or hallucinations, but states that he feels very anxious.     In the ED, Tmax 99,  - 143 (sinus) 107, /77 - 189/145, RR 18 - 22, Sat 97% - 100%. In the ED, he was given 4 mg Zofran, Lopressor 5 mg IVP, , Mag 2 gm, Lasix 40 mg, Aivan 2 mg x3, and 1L NS banana bag x2, Librium 50mg and Valium 10mg. (2017 02:11)      PAST MEDICAL & SURGICAL HISTORY:  HLD (hyperlipidemia)  Atrial fibrillation, unspecified type  Congestive heart failure: CHF (congestive heart failure)  Essential hypertension: HTN (hypertension)  No significant past surgical history      MEDICATIONS  (STANDING):  chlordiazePOXIDE 75 milliGRAM(s) Oral every 8 hours  lisinopril 5 milliGRAM(s) Oral daily  rivaroxaban 20 milliGRAM(s) Oral daily  furosemide   Injectable 40 milliGRAM(s) IV Push two times a day  amiodarone Infusion 1 mG/Min (33.333 mL/Hr) IV Continuous <Continuous>  amiodarone Infusion 0.5 mG/Min (16.667 mL/Hr) IV Continuous <Continuous>  metoprolol 100 milliGRAM(s) Oral two times a day    MEDICATIONS  (PRN):  LORazepam   Injectable 2 milliGRAM(s) IV Push every 4 hours PRN Agitation/anxiety      Social History: lives with "friends", 1st floor apartment, no home care services    Functional Level Prior to Admission: ADL independent, walks without assistive devices    FAMILY HISTORY:  No pertinent family history in first degree relatives      CBC Full  -  ( 2017 06:10 )  WBC Count : 7.1 K/uL  Hemoglobin : 11.8 g/dL  Hematocrit : 37.0 %  Platelet Count - Automated : 125 K/uL  Mean Cell Volume : 95.4 fL  Mean Cell Hemoglobin : 30.4 pg  Mean Cell Hemoglobin Concentration : 31.9 g/dL  Auto Neutrophil # : x  Auto Lymphocyte # : x  Auto Monocyte # : x  Auto Eosinophil # : x  Auto Basophil # : x  Auto Neutrophil % : x  Auto Lymphocyte % : x  Auto Monocyte % : x  Auto Eosinophil % : x  Auto Basophil % : x          144  |  104  |  26<H>  ----------------------------<  102<H>  3.8   |  23  |  1.70<H>    Ca    9.0      2017 06:10  Phos  2.9       Mg     2.0         TPro  7.0  /  Alb  3.7  /  TBili  1.7<H>  /  DBili  x   /  AST  176<H>  /  ALT  111<H>  /  AlkPhos  91        Urinalysis Basic - ( 2017 05:29 )    Color: Yellow / Appearance: Clear / S.020 / pH: x  Gluc: x / Ketone: NEGATIVE  / Bili: Small / Urobili: 1.0 E.U./dL   Blood: x / Protein: 30 mg/dL / Nitrite: NEGATIVE   Leuk Esterase: NEGATIVE / RBC: > 10 /HPF / WBC < 5 /HPF   Sq Epi: x / Non Sq Epi: Few /HPF / Bacteria: Present /HPF          Radiology:    < from: Xray Chest 1 View AP -PORTABLE-Routine (17 @ 10:37) >  EXAM:  XR CHEST 1 VIEW PORT ROUTINE                          PROCEDURE DATE:  2017                     INTERPRETATION:  INDICATION: Shortness of breath. Alcohol interval.    TECHNIQUE: Chest, single portable AP view.    COMPARISON: Chest radiographs 2017    FINDINGS:  Lungs are hypoinflated bilaterally with accentuation of bronchovascular   markings and cardiac silhouette. No evidence of focal airspace   consolidation on either side. Costophrenic angles are sharp bilaterally,   without sizable pleural effusions. No pneumothorax is evident.    Mediastinal and hilar contours are grossly unremarkable.       IMPRESSION:   Hypoventilatory changes without evidence of focal airspace disease or   sizable pleural effusions. Possible enlarged cardiac silhouette, which is   accentuated by technique and low lung volumes.              Vital Signs Last 24 Hrs  T(C): 37.3 (2017 13:36), Max: 37.3 (2017 13:36)  T(F): 99.1 (2017 13:36), Max: 99.1 (2017 13:36)  HR: 108 (2017 13:46) (102 - 138)  BP: 121/74 (2017 13:46) (107/84 - 171/96)  BP(mean): 89 (2017 13:46) (89 - 120)  RR: 20 (2017 13:46) (16 - 22)  SpO2: 96% (2017 13:46) (94% - 100%)    REVIEW OF SYSTEMS:    CONSTITUTIONAL: fatigue  EYES: No eye pain, visual disturbances, or discharge  ENMT:  No difficulty hearing, tinnitus, vertigo; No sinus or throat pain  NECK: No pain or stiffness  BREASTS: No pain, masses, or nipple discharge  RESPIRATORY:  shortness of breath  CARDIOVASCULAR: No chest pain, palpitations, dizziness, or leg swelling  GASTROINTESTINAL: No abdominal or epigastric pain. No nausea, vomiting, or hematemesis; No diarrhea or constipation. No melena or hematochezia.  GENITOURINARY: No dysuria, frequency, hematuria, or incontinence  NEUROLOGICAL: No headaches, memory loss, loss of strength, numbness, or tremors  SKIN: No itching, burning, rashes, or lesions   LYMPH NODES: No enlarged glands  ENDOCRINE: No heat or cold intolerance; No hair loss  MUSCULOSKELETAL: No joint pain or swelling; No muscle, back, or extremity pain  PSYCHIATRIC: No depression, anxiety, mood swings, or difficulty sleeping  HEME/LYMPH: No easy bruising, or bleeding gums  ALLERGY AND IMMUNOLOGIC: No hives or eczema      Physical Exam: obese  male lying in bed, w/o complaints    HEENT: normocephalic/ atraumatic, anicteric    Neck: supple, negative JVD, negative carotid bruits,    Chest: CTA bilaterally, neg wheeze, rhonchi, rales, crackles, egophany    Cardiovascular: regular rate and rhythm, neg murmurs/rubs/gallops    Abdomen: soft, non tender, negative rebound/guarding, non distended, normal bowel sounds, neg hepatosplenomegaly    Extremities: WWP, neg cyanosis/clubbing/edema, negative calf tenderness to palpation, negative Luisa's sign    Neurologic Exam:    Alert and oriented to person, place, date/year, speech fluent w/o dysarthria, repetition intact, comprehension intact,     Cranial Nerves:     II:                       pupils equal, round and reactive to light, visual fields intact   III/ IV/VI:             extraocular movements intact, neg nystagmus, ptosis  V:                      facial sensation intact, V1-3 normal  VII:                     face symmetric, no droop, normal eye closure and smile  VIII:                    hearing intact to finger rub bilaterally  IX/ X:                  soft palate rise symmetrical  XI:                      head turning, shoulder shrug normal  XII:                     tongue midline    Motor Exam:    Bilateral UE:        5/5 /intrinsics                            5/5 biceps/triceps/wrist extensors-flexors/deltoid                            negative pronator drift      Bilateral LE:        5/5 hip flexors/adductors/abductors                            5/5 quadriceps/hamstrings                            5/5 dorsiflexors/plantar flexors/invertors-evertors    Sensory: intact to LT/PP in all UE/LE dermatomes    DTR:        = biceps/     triceps/     brachioradialis                =  patella/   medial hamstring/    ankle                 neg clonus                 neg Babinski                 neg Hoffmans    Finger to Nose: wnl    Heel to Shin:      wnl    Rapid Alternating movements:  wnl    Joint Position Sense:  intact    Romberg: NT    Tandem Walking: NT    Gait:  NT        PM&R Impression:    1) deconditioned  2) no focal weakness      Recommendations:    1) Physical therapy focusing on therapeutic exercises, bed mobility/transfer out of bed evaluation, progressive ambulation with assistive devices.    2) Disposition Plan: d/c home

## 2017-07-13 NOTE — SWALLOW BEDSIDE ASSESSMENT ADULT - COMMENTS
Received awake but sleepy. Makes basic wants/needs known, responds to simple questions. Per RN, Pt is in withdrawal

## 2017-07-13 NOTE — PROGRESS NOTE ADULT - PROBLEM SELECTOR PLAN 2
managed by EP Dr. Kristofer Roberts, will come see patient today  -amiodarone 200mg  -Xarelto 200mg for anticoagulation per Dr. Sheth  -repeat EKG this morning showed a fib in 110's with RVR, another ekg this evening s/p ablation, managed by EP Dr. Kristofer Roberts, colleague Dr. Higuera is seeing patient   -amiodarone infusion: 05mg/min for total of 18 hours (started around 7AM), concern -RVR in 110's this morning   for transaminitis, will check LFT at 2PM today   -Xarelto 200mg for anticoagulation, home dose per Dr. Sheth  -check creatinine 2PM today, increase from 1.4-1.7 - may have to adjust Xarelto

## 2017-07-13 NOTE — CONSULT NOTE ADULT - ASSESSMENT
61 year old M w/ PMH of CHF, HTN, atrial fibrillation s/p ablation, HLD, EtOH abuse presented to [facility] ED w/ medication noncompliance x3 days and SOB x1 day most likely cardiac in origin     Problem/Plan - 1:  ·  Problem: Shortness of breath.  Plan: Shortness of breath- acute decompensated systolic CHF secondary to atrial flutter with rapid rates, Echo showed systolic HF with EF of 15-20%   -Dr. Higuera following (colleagues with Dr. Sheth and Armando)   -LAKEISHA delayed for S&S eval, cleared, will be NPO tomorrow for LAKEISHA in AM  -metoprolol 100mg BID  -lisinopril 5mg once a day (home dose 2.5mg)   -increase to lasix 40mg IV BID, increased from 80mg oral at home   -amiodarone infusion: 05mg/min for total of 18 hours (started around 7AM)  digoxin: will get second dose of 0.25mg around 2PM, check dig level tomorrow AM  -check LFT 4PM- make sure amiodarone is not causing transaminitis ()   -concern retaining fluid in bladder, but bladder scan again today was almost 0ml (7/13)  -obtain LE doppler to rule out DVT.     Problem/Plan - 2:  ·  Problem: Atrial flutter, unspecified type.  Plan: s/p ablation, managed by EP Dr. Kristofer Roberts, colleague Dr. Higuera is seeing patient   -amiodarone infusion: 05mg/min for total of 18 hours (started around 7AM), concern -RVR in 110's this morning   for transaminitis, will check LFT at 2PM today   -Xarelto 200mg for anticoagulation, home dose per Dr. Sheth  Problem/Plan - 3:  ·  Problem: Congestive heart failure.  Plan: History of CHF, EF unknown. BNP 4033.   -Echo: systolic HF with EF 15-20%.     Problem/Plan - 4:  ·  Problem: Alcohol withdrawal.  Plan: Anxiety and symptoms may be from cardiac issues.   -Continue to monitor CIWA.    -Librium 75mg q8 hours, Ativan 2mg q4 PRN.

## 2017-07-13 NOTE — BEHAVIORAL HEALTH ASSESSMENT NOTE - RISK ASSESSMENT
Pt is at low/ moderate risk of harm to self given psychosocial stressors, ongoing substance Pt is at low/ moderate risk given psychosocial stressors, ongoing substance use, and poor medication compliance. He does appear future oriented, denies SI, and identifies family as a primary reason for maintaining his health.

## 2017-07-13 NOTE — BEHAVIORAL HEALTH ASSESSMENT NOTE - NSBHCHARTREVIEWVS_PSY_A_CORE FT
Vital Signs Last 24 Hrs  T(C): 37.3 (13 Jul 2017 13:36), Max: 37.3 (13 Jul 2017 13:36)  T(F): 99.1 (13 Jul 2017 13:36), Max: 99.1 (13 Jul 2017 13:36)  HR: 108 (13 Jul 2017 13:46) (102 - 138)  BP: 121/74 (13 Jul 2017 13:46) (107/84 - 171/96)  BP(mean): 89 (13 Jul 2017 13:46) (89 - 120)  RR: 20 (13 Jul 2017 13:46) (16 - 22)  SpO2: 96% (13 Jul 2017 13:46) (94% - 100%)

## 2017-07-13 NOTE — BEHAVIORAL HEALTH ASSESSMENT NOTE - SUMMARY
60 y/o M with long standing h/o ETOH use and multiple cardiac comorbidities, currently admitted for treatment of SOB in the setting of medication noncompliance. Pt reports baseline levels of anxiety that have increased recently in the setting of psychosocial stressors, specifically impending homelessness as pt will need to move out of his current housing arrangement within 2 days. While it is possible that pt may suffer from an underlying anxiety disorder, this diagnosis cannot be made in the setting of ongoing substance use. Furthermore, given waxing and waning levels of lucidity, there is likely a component of delirium at present. Pt is, however, able to begin discussion about referral to inpatient rehab, to which he appears amendable at this time. Will continue to follow-up with pt regarding rehab. Recommend SW involvement to help coordinate dispo. Regarding management of ETOH WD, given elevated LFTs and oversedation at present, would switch standing Librium to standing Ativan with Ativan PRN. 60 y/o M with long standing h/o ETOH use and multiple cardiac comorbidities, currently admitted for treatment of SOB in the setting of medication noncompliance. Pt reports baseline levels of anxiety that have increased recently in the setting of psychosocial stressors, specifically impending homelessness as pt will need to move out of his current housing arrangement within 2 days. While it is possible that pt may suffer from an underlying anxiety disorder, this diagnosis cannot be made in the setting of ongoing substance use. Furthermore, given waxing and waning levels of lucidity, there is likely a component of delirium at present. Pt is, however, able to begin discussion about referral to inpatient rehab, to which he appears amendable at this time. However, pt's motivation towards rehab appears rooted in the need for housing as opposed to will to for alcohol cessation. Will continue to follow-up with pt regarding rehab. Recommend SW involvement to help coordinate dispo. Regarding management of ETOH WD, given elevated LFTs and oversedation at present, would switch standing Librium to standing Ativan with Ativan PRN. 62 y/o M with long standing h/o ETOH use and multiple cardiac comorbidities, currently admitted for treatment of SOB in the setting of medication noncompliance. Pt reports baseline levels of anxiety that have increased recently in the setting of psychosocial stressors, specifically impending homelessness as pt will need to move out of his current housing arrangement within 2 days. While it is possible that pt may suffer from an underlying anxiety disorder, this diagnosis cannot be made in the setting of ongoing substance use. Furthermore, given waxing and waning levels of lucidity, there is likely a component of delirium at present. Pt is, however, able to begin discussion about referral to inpatient rehab, to which he appears amenable at this time. However, pt's motivation towards rehab appears rooted in the need for housing as opposed to will to for alcohol cessation. Will continue to follow-up with pt regarding rehab. Recommend SW involvement to help coordinate dispo. Regarding management of ETOH WD, given elevated LFTs and oversedation at present, would switch standing Librium to standing Ativan with Ativan PRN.

## 2017-07-13 NOTE — PROGRESS NOTE ADULT - PROBLEM SELECTOR PLAN 4
Anxiety and symptoms may be from cardiac issues.   -Continue to monitor CIWA.    -Librium 75mg q8 hours, Ativan 2mg q4 PRN. Anxiety and symptoms may be from cardiac issues.   -Continue to monitor CIWA.    Ativan 2mg q4 PRN.

## 2017-07-13 NOTE — BEHAVIORAL HEALTH ASSESSMENT NOTE - NSBHSUICPROTECTFACT_PSY_A_CORE
Future oriented/Identifies reasons for living/Engaged in work or school/Responsibility to family and others

## 2017-07-13 NOTE — BEHAVIORAL HEALTH ASSESSMENT NOTE - PROBLEM SELECTOR PLAN 1
Recommend switching standing Librium to standing Ativan given elevated LFTs and oversedation. Continue Ativan PRN Recommend switching standing Librium to standing Ativan given elevated LFTs and oversedation. Would use no more than 8 mg Ativan daily dose at intervals no greater than 4-6 hours apart. Continue Ativan PRN.

## 2017-07-13 NOTE — SWALLOW BEDSIDE ASSESSMENT ADULT - SWALLOW EVAL: RECOMMENDED FEEDING/EATING TECHNIQUES
position upright (90 degrees)/small sips/bites/Eat slowly & break from eating with labored breathing

## 2017-07-13 NOTE — PROGRESS NOTE ADULT - PROBLEM SELECTOR PLAN 6
Anion gap metabolic acidosis possibly secondary to EtOH ketoacidosis vs elevated lactate.  -lactate 1.1   -consider ABG Anion gap metabolic acidosis possibly secondary to EtOH ketoacidosis vs elevated lactate.  -lactate 1.1 (7/12)  -consider ABG

## 2017-07-13 NOTE — PROGRESS NOTE ADULT - PROBLEM SELECTOR PLAN 1
Shortness of breath- likely cardiac origin: 2/2 afib with RVR vs. CHF exacerbation (elevated BNP.), aspiration pneumonia unlikely Pneumonia less likely as there is no leukocytosis, fever, exam findings and negative CXR, PE unlikely- Wells score <4, PE unlikely  -spoke to Cardiologist Dr. Roberts and EP Dr. Sheth- will come see patient today  -Graciela -EP, received 40 lasix in ED with good urine output   -obtain LE doppler to rule out DVT   -metoprolol 200mg once a day  -lisinopril 5mg once a day (home dose 2.5mg)   -increase to lasix 40mg BID, increased from 80mg oral at home   -echo  - Shortness of breath- acute decompensated systolic CHF secondary to atrial flutter with rapid rates, Echo showed systolic HF with EF of 15-20%   -Dr. Higuera following (colleagues with Dr. Sheth and Armando)   -LAKEISHA delayed for S&S eval, cleared, will be NPO tomorrow for LAKEISHA in AM  -metoprolol 100mg BID  -lisinopril 5mg once a day (home dose 2.5mg)   -increase to lasix 40mg IV BID, increased from 80mg oral at home   -amiodarone infusion: 05mg/min for total of 18 hours (started around 7AM)  digoxin: will get second dose of 0.25mg around 2PM, check dig level tomorrow AM  -check LFT 4PM- make sure amiodarone is not causing transaminitis ()     -obtain LE doppler to rule out DVT Shortness of breath- acute decompensated systolic CHF secondary to atrial flutter with rapid rates, Echo showed systolic HF with EF of 15-20%   -Dr. Higuera following (colleagues with Dr. Sheth and Armando)   -LAKEISHA delayed for S&S eval, cleared, will be NPO tomorrow for LAKEISHA in AM  -metoprolol 100mg BID  -lisinopril 5mg once a day (home dose 2.5mg)   -increase to lasix 40mg IV BID, increased from 80mg oral at home   -amiodarone infusion: 05mg/min for total of 18 hours (started around 7AM)  digoxin: will get second dose of 0.25mg around 2PM, check dig level tomorrow AM  -check LFT 4PM- make sure amiodarone is not causing transaminitis ()   -concern retaining fluid in bladder, but bladder scan again today was almost 0ml (7/13)  -obtain LE doppler to rule out DVT

## 2017-07-13 NOTE — PROGRESS NOTE ADULT - ASSESSMENT
61 year old M w/ PMH of CHF, HTN, atrial fibrillation s/p ablation, HLD, EtOH abuse presented to [facility] ED w/ medication noncompliance x3 days and SOB x1 day most likely cardiac in origin

## 2017-07-13 NOTE — SWALLOW BEDSIDE ASSESSMENT ADULT - SWALLOW EVAL: DIAGNOSIS
Functional mechanical swallow with risk for aspiration increased secondary to compromised pulmonary status as evidenced by increased work of breathing.

## 2017-07-13 NOTE — PROGRESS NOTE ADULT - PROBLEM SELECTOR PLAN 3
History of CHF, EF unknown. BNP 4033.   -Echo today  -repeat EKG shows a-fib with RVR (7/12) History of CHF, EF unknown. BNP 4033.   -Echo: systolic HF with EF 15-20%

## 2017-07-13 NOTE — CHART NOTE - NSCHARTNOTEFT_GEN_A_CORE
Since 9PM patient has been tachycardic to the 130s and agitated. T98.1-98.9, -161/, RR 20-22, [id number]. Patient received 4 metoprolol 5mg IV pushes, 3 ativan 2 mg pushes, one librium 200mg tablet, verapamil 5mg IV push, digoxin 0.5 IV push, and a amiodarone drip. Patient  HR now in 110-120 range and is resting comfortably. Since 9PM patient has been tachycardic to the 130s and agitated. T98.1-98.9, -161/, RR 20-22, [id number]. Patient received metoprolol 5mg IV pushes x3 , ativan 2 mg pushes x2. Patient was still tachycardic to the 130s, diaphoretic, and agitated. Consulted cardiology who recommended dig load, patient given dig 0.5mg IV x1 followed by 0.25q6. Discussed with MICU attending and decision was made to push verapamil 5mg IV x1, metoprolol 5mg IV push x1, amiodarone IV load (patient previously ordered for amio PO but effect would not take place in acute setting). Given low EF, goal BP would be systolics of low 100s. Patient able to tolerate further rate control. Patient HR now in 110-120 range and is resting comfortably. Since 9PM patient has been tachycardic to the 130s and agitated. T98.1-98.9, -161/, RR 20-22, [id number]. Patient received metoprolol 5mg IV pushes x3 , ativan 2 mg pushes x2. Patient was still tachycardic to the 130s, diaphoretic, and agitated. Consulted cardiology who recommended dig load, patient given dig 0.5mg IV x1 followed by 0.25q6. Discussed with MICU attending and decision was made to push verapamil 5mg IV x1, metoprolol 5mg IV push x1, amiodarone IV load (patient previously ordered for amio PO but effect would not take place in acute setting), and ativan 2mg push x1. Given low EF, goal BP would be systolics of low 100s. Patient able to tolerate further rate control. Patient HR now in 110-120 range and is resting comfortably.

## 2017-07-13 NOTE — PROGRESS NOTE ADULT - SUBJECTIVE AND OBJECTIVE BOX
INTERVAL HPI/OVERNIGHT EVENTS:    OVERNIGHT: No overnight events.  SUBJECTIVE: Patient seen and examined at bedside.     ROS:  CV: Denies chest pain  Resp: Denies SOB  GI: Denies abdominal pain, constipation, diarrhea, nausea, vomiting  : Denies dysuria  ID: Denies fevers, chills  MSK: Denies joint pain     OBJECTIVE:    VITAL SIGNS:  ICU Vital Signs Last 24 Hrs  T(C): 36.4 (2017 05:06), Max: 37.2 (2017 10:00)  T(F): 97.5 (2017 05:06), Max: 98.9 (2017 10:00)  HR: 110 (2017 04:45) (110 - 138)  BP: 135/99 (2017 04:45) (107/84 - 161/96)  BP(mean): 111 (2017 04:45) (91 - 120)  ABP: --  ABP(mean): --  RR: 22 (2017 04:45) (16 - 22)  SpO2: 95% (2017 04:45) (95% - 100%)        07-12 @ 07:01  -  07-13 @ 06:09  --------------------------------------------------------  IN: 1143.2 mL / OUT: 1550 mL / NET: -406.8 mL      CAPILLARY BLOOD GLUCOSE  100 (2017 16:57)          PHYSICAL EXAM:    General: NAD, comfortable  HEENT: NCAT, PERRL, clear conjunctiva, no scleral icterus  Neck: supple, no JVD  Respiratory: CTA b/l, no wheezing, rhonchi, rales  Cardiovascular: RRR, normal S1S2, no M/R/G  Abdomen: soft, NT/ND, bowel sounds in all four quadrants, no palpable masses  Extremities: WWP, no clubbing, cyanosis, or edema  Neuro:     MEDICATIONS:  MEDICATIONS  (STANDING):  metoprolol succinate  milliGRAM(s) Oral daily  chlordiazePOXIDE 75 milliGRAM(s) Oral every 8 hours  lisinopril 5 milliGRAM(s) Oral daily  rivaroxaban 20 milliGRAM(s) Oral daily  amiodarone    Tablet 200 milliGRAM(s) Oral two times a day  furosemide   Injectable 40 milliGRAM(s) IV Push two times a day  digoxin  IVPB 0.25 milliGRAM(s) IV Intermittent every 6 hours  amiodarone Infusion 1 mG/Min (33.333 mL/Hr) IV Continuous <Continuous>  amiodarone Infusion 0.5 mG/Min (16.667 mL/Hr) IV Continuous <Continuous>    MEDICATIONS  (PRN):  LORazepam   Injectable 2 milliGRAM(s) IV Push every 4 hours PRN Agitation/anxiety      ALLERGIES:  Allergies    No Known Allergies    Intolerances        LABS:                        11.7   5.8   )-----------( 114      ( 2017 11:34 )             36.1         143  |  105  |  22  ----------------------------<  101<H>  4.0   |  19<L>  |  1.40<H>    Ca    9.3      2017 11:34  Phos  3.2       Mg     3.0         TPro  7.1  /  Alb  4.0  /  TBili  1.3<H>  /  DBili  0.3<H>  /  AST  98<H>  /  ALT  73<H>  /  AlkPhos  79  12    PT/INR - ( 2017 04:57 )   PT: 11.5 sec;   INR: 1.04          PTT - ( 2017 04:57 )  PTT:27.4 sec  Urinalysis Basic - ( 2017 05:29 )    Color: Yellow / Appearance: Clear / S.020 / pH: x  Gluc: x / Ketone: NEGATIVE  / Bili: Small / Urobili: 1.0 E.U./dL   Blood: x / Protein: 30 mg/dL / Nitrite: NEGATIVE   Leuk Esterase: NEGATIVE / RBC: > 10 /HPF / WBC < 5 /HPF   Sq Epi: x / Non Sq Epi: Few /HPF / Bacteria: Present /HPF        RADIOLOGY & ADDITIONAL TESTS: Reviewed. INTERVAL HPI/OVERNIGHT EVENTS:    OVERNIGHT: Patient had a fib/a-flutter with RVR to 150-160, patient agitated, diaphoretic, attempting to leave, somewhat confused    patient received:  -Lopressor 5mg x3  -Ativan 2mg x2    -Cardiology consulted because Dr. Higuera could not be reached. Per advise:    -Digoxin 0.5mg followed by 0.25mg Q6  -Verapamil 5mg IV    -Dr. Maciel then advised    -Lopressor 5mg IV  -Amiodarone 150mg loading, then infusion 1mg/min for 6 hours, then 0.5mg/min           SUBJECTIVE: Patient seen and examined at bedside.     ROS:  CV: Denies chest pain  Resp: Denies SOB  GI: Denies abdominal pain, constipation, diarrhea, nausea, vomiting  : Denies dysuria  ID: Denies fevers, chills  MSK: Denies joint pain     OBJECTIVE:    VITAL SIGNS:  ICU Vital Signs Last 24 Hrs  T(C): 36.4 (2017 05:06), Max: 37.2 (2017 10:00)  T(F): 97.5 (2017 05:06), Max: 98.9 (2017 10:00)  HR: 110 (2017 04:45) (110 - 138)  BP: 135/99 (2017 04:45) (107/84 - 161/96)  BP(mean): 111 (2017 04:45) (91 - 120)  ABP: --  ABP(mean): --  RR: 22 (2017 04:45) (16 - 22)  SpO2: 95% (2017 04:45) (95% - 100%)        07-12 @ 07:01  -  07-13 @ 06:09  --------------------------------------------------------  IN: 1143.2 mL / OUT: 1550 mL / NET: -406.8 mL      CAPILLARY BLOOD GLUCOSE  100 (2017 16:57)          PHYSICAL EXAM:    General: NAD, comfortable  HEENT: NCAT, PERRL, clear conjunctiva, no scleral icterus  Neck: supple, no JVD  Respiratory: CTA b/l, no wheezing, rhonchi, rales  Cardiovascular: RRR, normal S1S2, no M/R/G  Abdomen: soft, NT/ND, bowel sounds in all four quadrants, no palpable masses  Extremities: WWP, no clubbing, cyanosis, or edema  Neuro:     MEDICATIONS:  MEDICATIONS  (STANDING):  metoprolol succinate  milliGRAM(s) Oral daily  chlordiazePOXIDE 75 milliGRAM(s) Oral every 8 hours  lisinopril 5 milliGRAM(s) Oral daily  rivaroxaban 20 milliGRAM(s) Oral daily  amiodarone    Tablet 200 milliGRAM(s) Oral two times a day  furosemide   Injectable 40 milliGRAM(s) IV Push two times a day  digoxin  IVPB 0.25 milliGRAM(s) IV Intermittent every 6 hours  amiodarone Infusion 1 mG/Min (33.333 mL/Hr) IV Continuous <Continuous>  amiodarone Infusion 0.5 mG/Min (16.667 mL/Hr) IV Continuous <Continuous>    MEDICATIONS  (PRN):  LORazepam   Injectable 2 milliGRAM(s) IV Push every 4 hours PRN Agitation/anxiety      ALLERGIES:  Allergies    No Known Allergies    Intolerances        LABS:                        11.7   5.8   )-----------( 114      ( 2017 11:34 )             36.1     07-12    143  |  105  |  22  ----------------------------<  101<H>  4.0   |  19<L>  |  1.40<H>    Ca    9.3      2017 11:34  Phos  3.2     -  Mg     3.0         TPro  7.1  /  Alb  4.0  /  TBili  1.3<H>  /  DBili  0.3<H>  /  AST  98<H>  /  ALT  73<H>  /  AlkPhos  79  07-12    PT/INR - ( 2017 04:57 )   PT: 11.5 sec;   INR: 1.04          PTT - ( 2017 04:57 )  PTT:27.4 sec  Urinalysis Basic - ( 2017 05:29 )    Color: Yellow / Appearance: Clear / S.020 / pH: x  Gluc: x / Ketone: NEGATIVE  / Bili: Small / Urobili: 1.0 E.U./dL   Blood: x / Protein: 30 mg/dL / Nitrite: NEGATIVE   Leuk Esterase: NEGATIVE / RBC: > 10 /HPF / WBC < 5 /HPF   Sq Epi: x / Non Sq Epi: Few /HPF / Bacteria: Present /HPF        RADIOLOGY & ADDITIONAL TESTS: Reviewed. INTERVAL HPI/OVERNIGHT EVENTS:    OVERNIGHT: Patient had a fib/a-flutter with RVR to 150-160, patient agitated, diaphoretic, attempting to leave, somewhat confused    patient received:  -Lopressor 5mg x3  -Ativan 2mg x2    -Cardiology consulted because Dr. Higuera could not be reached. Per advise:    -Digoxin 0.5mg followed by 0.25mg Q6  -Verapamil 5mg IV    -Dr. Maciel then advised    -Lopressor 5mg IV  -Amiodarone 150mg loading, then infusion 1mg/min for 6 hours, then 0.5mg/min   -Ativan 2mg          SUBJECTIVE: Patient seen and examined at bedside. Still tachy to 110's but has no complaints in the morning.     ROS:  CV: Denies chest pain  Resp: Denies SOB  GI: Denies abdominal pain, constipation, diarrhea, nausea, vomiting  : Denies dysuria  ID: Denies fevers, chills  MSK: Denies joint pain     OBJECTIVE:    VITAL SIGNS:  ICU Vital Signs Last 24 Hrs  T(C): 36.4 (2017 05:06), Max: 37.2 (2017 10:00)  T(F): 97.5 (2017 05:06), Max: 98.9 (2017 10:00)  HR: 110 (2017 04:45) (110 - 138)  BP: 135/99 (2017 04:45) (107/84 - 161/96)  BP(mean): 111 (2017 04:45) (91 - 120)  ABP: --  ABP(mean): --  RR: 22 (2017 04:45) (16 - 22)  SpO2: 95% (2017 04:45) (95% - 100%)        07-12 @ 07:01  -  07-13 @ 06:09  --------------------------------------------------------  IN: 1143.2 mL / OUT: 1550 mL / NET: -406.8 mL      CAPILLARY BLOOD GLUCOSE  100 (2017 16:57)          PHYSICAL EXAM:    General: slightly diaphoretic, uncomfortable   HEENT: NCAT, PERRL, clear conjunctiva, no scleral icterus  Neck: supple, no JVD  Respiratory: CTA b/l, no wheezing, rhonchi, rales, + nasal flaring and belly breathing   Cardiovascular: RRR, normal S1S2, no M/R/G  Abdomen: soft, NT/ND, bowel sounds in all four quadrants, no palpable masses  Extremities: WWP, no clubbing, cyanosis, or edema  Neuro: grossly intact     MEDICATIONS:  MEDICATIONS  (STANDING):  metoprolol succinate  milliGRAM(s) Oral daily  chlordiazePOXIDE 75 milliGRAM(s) Oral every 8 hours  lisinopril 5 milliGRAM(s) Oral daily  rivaroxaban 20 milliGRAM(s) Oral daily  amiodarone    Tablet 200 milliGRAM(s) Oral two times a day  furosemide   Injectable 40 milliGRAM(s) IV Push two times a day  digoxin  IVPB 0.25 milliGRAM(s) IV Intermittent every 6 hours  amiodarone Infusion 1 mG/Min (33.333 mL/Hr) IV Continuous <Continuous>  amiodarone Infusion 0.5 mG/Min (16.667 mL/Hr) IV Continuous <Continuous>    MEDICATIONS  (PRN):  LORazepam   Injectable 2 milliGRAM(s) IV Push every 4 hours PRN Agitation/anxiety      ALLERGIES:  Allergies    No Known Allergies    Intolerances        LABS:                        11.7   5.8   )-----------( 114      ( 2017 11:34 )             36.1     07-12    143  |  105  |  22  ----------------------------<  101<H>  4.0   |  19<L>  |  1.40<H>    Ca    9.3      2017 11:34  Phos  3.2     07-12  Mg     3.0     -12    TPro  7.1  /  Alb  4.0  /  TBili  1.3<H>  /  DBili  0.3<H>  /  AST  98<H>  /  ALT  73<H>  /  AlkPhos  79  07-12    PT/INR - ( 2017 04:57 )   PT: 11.5 sec;   INR: 1.04          PTT - ( 2017 04:57 )  PTT:27.4 sec  Urinalysis Basic - ( 2017 05:29 )    Color: Yellow / Appearance: Clear / S.020 / pH: x  Gluc: x / Ketone: NEGATIVE  / Bili: Small / Urobili: 1.0 E.U./dL   Blood: x / Protein: 30 mg/dL / Nitrite: NEGATIVE   Leuk Esterase: NEGATIVE / RBC: > 10 /HPF / WBC < 5 /HPF   Sq Epi: x / Non Sq Epi: Few /HPF / Bacteria: Present /HPF        RADIOLOGY & ADDITIONAL TESTS: Reviewed.

## 2017-07-13 NOTE — BEHAVIORAL HEALTH ASSESSMENT NOTE - HPI (INCLUDE ILLNESS QUALITY, SEVERITY, DURATION, TIMING, CONTEXT, MODIFYING FACTORS, ASSOCIATED SIGNS AND SYMPTOMS)
62 y/o  M, , domiciled, employed, with PMH of CHF, HTN, atrial fibrillation s/p ablation, HLD, past psychiatric history of ETOH use disorder, admitted to [facility] for treatment SOB in the setting of medication non-compliance x 3 days. Consult requested for evaluation of anxiety as pt had expressed current anxiety along with h/o panic attacks to primary team. Upon evaluation, pt initially AAO x 4, appears moderately sedated, but answers questions appropriately. Pt reports years of baseline levels of anxiety related to ongoing worry about his adult children and wife, who left him in 2009. Feelings of worry recently increased PTA due to impending homelessness; pt is currently staying with a friend and will need to move out in 2 days due to new tenants moving into the home. At present he notes additional financial concerns regarding financial bills that incurred from his current hospital stay. Regarding h/o panic attacks, pt reports periods of high levels of anxiety, stating "sometimes my nerves get the best of me," but denies associated CP, SOB, tremulousness, dizziness, nausea, etc. Pt has long-standing h/o ETOH use, most recently consuming 1 pint of vodka daily. He denies current sx of ETOH WD including HA, nausea, tremulousness, diaphoresis, AH/VH, or tactile hallucinations, Pt denies current neurovegetative sx of depression, denies current or h/o SI, and denies sx of psychosis or farnaz.    When pt was seen again by writer and Attending psychiatrist within 30 min after initial encounter, pt had a marked change in LOC in that he was falling asleep during conversation, believed he was in a school, and no longer could recall the day, date, etc, all suggesting a possible component of delirium at present.

## 2017-07-14 DIAGNOSIS — F41.9 ANXIETY DISORDER, UNSPECIFIED: ICD-10-CM

## 2017-07-14 DIAGNOSIS — Z02.9 ENCOUNTER FOR ADMINISTRATIVE EXAMINATIONS, UNSPECIFIED: ICD-10-CM

## 2017-07-14 LAB
ALBUMIN SERPL ELPH-MCNC: 3.4 G/DL — SIGNIFICANT CHANGE UP (ref 3.3–5)
ALP SERPL-CCNC: 80 U/L — SIGNIFICANT CHANGE UP (ref 40–120)
ALT FLD-CCNC: 75 U/L — HIGH (ref 10–45)
ANION GAP SERPL CALC-SCNC: 18 MMOL/L — HIGH (ref 5–17)
AST SERPL-CCNC: 53 U/L — HIGH (ref 10–40)
BILIRUB SERPL-MCNC: 1.2 MG/DL — SIGNIFICANT CHANGE UP (ref 0.2–1.2)
BUN SERPL-MCNC: 26 MG/DL — HIGH (ref 7–23)
CALCIUM SERPL-MCNC: 8.9 MG/DL — SIGNIFICANT CHANGE UP (ref 8.4–10.5)
CHLORIDE SERPL-SCNC: 102 MMOL/L — SIGNIFICANT CHANGE UP (ref 96–108)
CO2 SERPL-SCNC: 22 MMOL/L — SIGNIFICANT CHANGE UP (ref 22–31)
CREAT SERPL-MCNC: 1.6 MG/DL — HIGH (ref 0.5–1.3)
DIGOXIN SERPL-MCNC: 1 NG/ML — SIGNIFICANT CHANGE UP (ref 0.8–2)
GLUCOSE SERPL-MCNC: 89 MG/DL — SIGNIFICANT CHANGE UP (ref 70–99)
HCT VFR BLD CALC: 41.2 % — SIGNIFICANT CHANGE UP (ref 39–50)
HGB BLD-MCNC: 13.4 G/DL — SIGNIFICANT CHANGE UP (ref 13–17)
MAGNESIUM SERPL-MCNC: 1.8 MG/DL — SIGNIFICANT CHANGE UP (ref 1.6–2.6)
MCHC RBC-ENTMCNC: 30.9 PG — SIGNIFICANT CHANGE UP (ref 27–34)
MCHC RBC-ENTMCNC: 32.5 G/DL — SIGNIFICANT CHANGE UP (ref 32–36)
MCV RBC AUTO: 94.9 FL — SIGNIFICANT CHANGE UP (ref 80–100)
PHOSPHATE SERPL-MCNC: 4.3 MG/DL — SIGNIFICANT CHANGE UP (ref 2.5–4.5)
PLATELET # BLD AUTO: 132 K/UL — LOW (ref 150–400)
POTASSIUM SERPL-MCNC: 3.6 MMOL/L — SIGNIFICANT CHANGE UP (ref 3.5–5.3)
POTASSIUM SERPL-SCNC: 3.6 MMOL/L — SIGNIFICANT CHANGE UP (ref 3.5–5.3)
PROT SERPL-MCNC: 6.9 G/DL — SIGNIFICANT CHANGE UP (ref 6–8.3)
RBC # BLD: 4.34 M/UL — SIGNIFICANT CHANGE UP (ref 4.2–5.8)
RBC # FLD: 15.6 % — SIGNIFICANT CHANGE UP (ref 10.3–16.9)
SODIUM SERPL-SCNC: 142 MMOL/L — SIGNIFICANT CHANGE UP (ref 135–145)
WBC # BLD: 7 K/UL — SIGNIFICANT CHANGE UP (ref 3.8–10.5)
WBC # FLD AUTO: 7 K/UL — SIGNIFICANT CHANGE UP (ref 3.8–10.5)

## 2017-07-14 PROCEDURE — 93325 DOPPLER ECHO COLOR FLOW MAPG: CPT | Mod: 26

## 2017-07-14 PROCEDURE — 99223 1ST HOSP IP/OBS HIGH 75: CPT

## 2017-07-14 PROCEDURE — 93970 EXTREMITY STUDY: CPT | Mod: 26

## 2017-07-14 PROCEDURE — 99233 SBSQ HOSP IP/OBS HIGH 50: CPT | Mod: GC

## 2017-07-14 PROCEDURE — 93320 DOPPLER ECHO COMPLETE: CPT | Mod: 26

## 2017-07-14 PROCEDURE — 76700 US EXAM ABDOM COMPLETE: CPT | Mod: 26

## 2017-07-14 PROCEDURE — 93312 ECHO TRANSESOPHAGEAL: CPT | Mod: 26

## 2017-07-14 RX ORDER — MAGNESIUM SULFATE 500 MG/ML
1 VIAL (ML) INJECTION ONCE
Qty: 0 | Refills: 0 | Status: COMPLETED | OUTPATIENT
Start: 2017-07-14 | End: 2017-07-14

## 2017-07-14 RX ORDER — POTASSIUM CHLORIDE 20 MEQ
40 PACKET (EA) ORAL ONCE
Qty: 0 | Refills: 0 | Status: COMPLETED | OUTPATIENT
Start: 2017-07-14 | End: 2017-07-14

## 2017-07-14 RX ORDER — DIGOXIN 250 MCG
0.12 TABLET ORAL DAILY
Qty: 0 | Refills: 0 | Status: DISCONTINUED | OUTPATIENT
Start: 2017-07-14 | End: 2017-07-20

## 2017-07-14 RX ORDER — AMIODARONE HYDROCHLORIDE 400 MG/1
200 TABLET ORAL
Qty: 0 | Refills: 0 | Status: DISCONTINUED | OUTPATIENT
Start: 2017-07-14 | End: 2017-07-20

## 2017-07-14 RX ORDER — LISINOPRIL 2.5 MG/1
10 TABLET ORAL DAILY
Qty: 0 | Refills: 0 | Status: DISCONTINUED | OUTPATIENT
Start: 2017-07-15 | End: 2017-07-19

## 2017-07-14 RX ORDER — DIGOXIN 250 MCG
0.25 TABLET ORAL DAILY
Qty: 0 | Refills: 0 | Status: DISCONTINUED | OUTPATIENT
Start: 2017-07-14 | End: 2017-07-14

## 2017-07-14 RX ADMIN — Medication 40 MILLIGRAM(S): at 18:12

## 2017-07-14 RX ADMIN — AMIODARONE HYDROCHLORIDE 200 MILLIGRAM(S): 400 TABLET ORAL at 18:13

## 2017-07-14 RX ADMIN — RIVAROXABAN 20 MILLIGRAM(S): KIT at 17:17

## 2017-07-14 RX ADMIN — Medication 100 GRAM(S): at 07:39

## 2017-07-14 RX ADMIN — Medication 0.25 MILLIGRAM(S): at 09:31

## 2017-07-14 RX ADMIN — Medication 2 MILLIGRAM(S): at 21:52

## 2017-07-14 RX ADMIN — Medication 40 MILLIEQUIVALENT(S): at 07:39

## 2017-07-14 RX ADMIN — Medication 40 MILLIGRAM(S): at 07:21

## 2017-07-14 RX ADMIN — AMIODARONE HYDROCHLORIDE 200 MILLIGRAM(S): 400 TABLET ORAL at 09:31

## 2017-07-14 RX ADMIN — LISINOPRIL 5 MILLIGRAM(S): 2.5 TABLET ORAL at 07:21

## 2017-07-14 RX ADMIN — Medication 100 MILLIGRAM(S): at 21:52

## 2017-07-14 RX ADMIN — Medication 100 MILLIGRAM(S): at 07:21

## 2017-07-14 NOTE — PROGRESS NOTE ADULT - SUBJECTIVE AND OBJECTIVE BOX
INTERVAL HPI/OVERNIGHT EVENTS:    OVERNIGHT: No overnight events.  SUBJECTIVE: Patient seen and examined at bedside. Patient has no complaints     ROS:  CV: Denies chest pain  Resp: Denies SOB  GI: Denies abdominal pain, constipation, diarrhea, nausea, vomiting  : Denies dysuria  ID: Denies fevers, chills  MSK: Denies joint pain     OBJECTIVE:    VITAL SIGNS:  ICU Vital Signs Last 24 Hrs  T(C): 37.4 (14 Jul 2017 05:44), Max: 37.4 (14 Jul 2017 05:44)  T(F): 99.4 (14 Jul 2017 05:44), Max: 99.4 (14 Jul 2017 05:44)  HR: 82 (14 Jul 2017 00:46) (82 - 116)  BP: 118/79 (14 Jul 2017 00:46) (108/61 - 171/96)  BP(mean): 93 (14 Jul 2017 00:46) (79 - 106)  ABP: --  ABP(mean): --  RR: 20 (14 Jul 2017 00:46) (20 - 22)  SpO2: 99% (14 Jul 2017 00:46) (93% - 99%)        07-12 @ 07:01 - 07-13 @ 07:00  --------------------------------------------------------  IN: 1276.5 mL / OUT: 2300 mL / NET: -1023.5 mL    07-13 @ 07:01 - 07-14 @ 06:58  --------------------------------------------------------  IN: 0 mL / OUT: 1305 mL / NET: -1305 mL      CAPILLARY BLOOD GLUCOSE  100 (12 Jul 2017 16:57)          PHYSICAL EXAM:    General: NAD, comfortable  HEENT: NCAT, PERRL, clear conjunctiva, no scleral icterus  Neck: supple, no JVD  Respiratory: CTA b/l, no wheezing, rhonchi, rales  Cardiovascular: irregular, normal S1S2, no M/R/G  Abdomen: soft, NT/ND, bowel sounds in all four quadrants, no palpable masses  Extremities: WWP, no clubbing, cyanosis, or edema  Neuro: grossly intact     MEDICATIONS:  MEDICATIONS  (STANDING):  lisinopril 5 milliGRAM(s) Oral daily  rivaroxaban 20 milliGRAM(s) Oral daily  furosemide   Injectable 40 milliGRAM(s) IV Push two times a day  metoprolol 100 milliGRAM(s) Oral two times a day    MEDICATIONS  (PRN):  LORazepam   Injectable 2 milliGRAM(s) IV Push every 8 hours PRN Agitation/anxiety      ALLERGIES:  Allergies    No Known Allergies    Intolerances        LABS:                        13.4   7.0   )-----------( 132      ( 14 Jul 2017 06:33 )             41.2     07-13    141  |  104  |  25<H>  ----------------------------<  100<H>  3.8   |  20<L>  |  1.70<H>    Ca    9.0      13 Jul 2017 15:47  Phos  2.9     07-13  Mg     2.0     07-13    TPro  7.2  /  Alb  3.7  /  TBili  1.4<H>  /  DBili  x   /  AST  109<H>  /  ALT  99<H>  /  AlkPhos  90  07-13          RADIOLOGY & ADDITIONAL TESTS: Reviewed. 7LA Transfer Note: Hospital Course    61 year old M w/ PMH of CHF, HTN, atrial fibrillation s/p ablation, HLD, EtOH abuse presented to [facility] ED w/ SOB. Patient states that he ran out of his medications this past Sunday and started to feel short of breath last night. Treated before at outside hospitals for similar events. He also has been drinking a pint of vodka for the last 4 years daily. His last drink was 8PM Monday evening. He denies a history of alcohol withdrawal seizures.  Hospital course was complicated by a-flutter and then a-fib with RVR going to a heart rate up to 150, which was treated with multiple medications and finally controlled with metoprolol, digoxin, and amiodarone. Patient is still in a-fib, but heart rate has been fairly well controlled (). There was a concern patient was in alcohol withdrawal upon admission because patient was very agitated and diaphoretic, but consensus is that most symptoms probably stemming from CHF exacerbation. Patient also has anxiety and other psychiatric issues and is followed by psychiatry who recommended ativan 2mg q6. Patient also has social issues and is concerned about being homeless.          INTERVAL HPI/OVERNIGHT EVENTS:    OVERNIGHT: No overnight events.  SUBJECTIVE: Patient seen and examined at bedside. Patient has no complaints     ROS:  CV: Denies chest pain  Resp: Denies SOB  GI: Denies abdominal pain, constipation, diarrhea, nausea, vomiting  : Denies dysuria  ID: Denies fevers, chills  MSK: Denies joint pain     OBJECTIVE:    VITAL SIGNS:  ICU Vital Signs Last 24 Hrs  T(C): 37.4 (14 Jul 2017 05:44), Max: 37.4 (14 Jul 2017 05:44)  T(F): 99.4 (14 Jul 2017 05:44), Max: 99.4 (14 Jul 2017 05:44)  HR: 82 (14 Jul 2017 00:46) (82 - 116)  BP: 118/79 (14 Jul 2017 00:46) (108/61 - 171/96)  BP(mean): 93 (14 Jul 2017 00:46) (79 - 106)  ABP: --  ABP(mean): --  RR: 20 (14 Jul 2017 00:46) (20 - 22)  SpO2: 99% (14 Jul 2017 00:46) (93% - 99%)        07-12 @ 07:01  -  07-13 @ 07:00  --------------------------------------------------------  IN: 1276.5 mL / OUT: 2300 mL / NET: -1023.5 mL    07-13 @ 07:01 - 07-14 @ 06:58  --------------------------------------------------------  IN: 0 mL / OUT: 1305 mL / NET: -1305 mL      CAPILLARY BLOOD GLUCOSE  100 (12 Jul 2017 16:57)          PHYSICAL EXAM:    General: NAD, comfortable  HEENT: NCAT, PERRL, clear conjunctiva, no scleral icterus  Neck: supple, no JVD  Respiratory: CTA b/l, no wheezing, rhonchi, rales  Cardiovascular: irregular, normal S1S2, no M/R/G  Abdomen: soft, NT/ND, bowel sounds in all four quadrants, no palpable masses  Extremities: WWP, no clubbing, cyanosis, or edema  Neuro: grossly intact     MEDICATIONS:  MEDICATIONS  (STANDING):  lisinopril 5 milliGRAM(s) Oral daily  rivaroxaban 20 milliGRAM(s) Oral daily  furosemide   Injectable 40 milliGRAM(s) IV Push two times a day  metoprolol 100 milliGRAM(s) Oral two times a day    MEDICATIONS  (PRN):  LORazepam   Injectable 2 milliGRAM(s) IV Push every 8 hours PRN Agitation/anxiety      ALLERGIES:  Allergies    No Known Allergies    Intolerances        LABS:                        13.4   7.0   )-----------( 132      ( 14 Jul 2017 06:33 )             41.2     07-13    141  |  104  |  25<H>  ----------------------------<  100<H>  3.8   |  20<L>  |  1.70<H>    Ca    9.0      13 Jul 2017 15:47  Phos  2.9     07-13  Mg     2.0     07-13    TPro  7.2  /  Alb  3.7  /  TBili  1.4<H>  /  DBili  x   /  AST  109<H>  /  ALT  99<H>  /  AlkPhos  90  07-13          RADIOLOGY & ADDITIONAL TESTS: Reviewed.

## 2017-07-14 NOTE — PROGRESS NOTE ADULT - SUBJECTIVE AND OBJECTIVE BOX
61 year old M poor historian w/ PMH of CHF, HTN, atrial fibrillation s/p ablation, HLD, EtOH abuse presenting to [facility] ED w/ SOB. Patient states he ran out of his medications this past Sunday and started to feel short of breath last night. He has worsening dyspnea on exertion. He also has been drinking a pint of vodka for the last 4 years daily. His last drink was last night. He denies a history of alcohol withdrawal seizures and is unsure if he has ever been intubated. He currently denies any nausea or vomiting, has some anxiety, denies hallucinations. In the ED, T 99,  (sinus) --> 107, /145 --> 137/88, RR 22, Sat 100%. In the ED, he was given 4 mg zofran, lopressor 5 mg IVP, , Mag 2 gm, lasix 40 mg, ativan 2 mg x2, and 1L NS bolus.     MY ASSESSMENT TODAY:  Drowsy post LAKEISHA  LAKEISHA - no LA/GOLD thrombus  Seen by Psych  12 Lead: AFL with variable conduction - typical AFL  Tele: Periods of AF seen    MEDICATIONS:  furosemide   Injectable 40 milliGRAM(s) IV Push two times a day  metoprolol 100 milliGRAM(s) Oral two times a day  amiodarone    Tablet 200 milliGRAM(s) Oral two times a day  digoxin     Tablet 0.125 milliGRAM(s) Oral daily  LORazepam   Injectable 2 milliGRAM(s) IV Push every 6 hours PRN  rivaroxaban 20 milliGRAM(s) Oral daily      	    [PHYSICAL EXAM:  TELEMETRY:  T(C): 36.3 (07-14-17 @ 08:52), Max: 37.4 (07-14-17 @ 05:44)  HR: 88 (07-14-17 @ 15:15) (80 - 106)  BP: 129/92 (07-14-17 @ 15:15) (108/61 - 145/67)  RR: 18 (07-14-17 @ 15:15) (18 - 20)  SpO2: 96% (07-14-17 @ 15:15) (93% - 99%)  Wt(kg): --  I&O's Summary    13 Jul 2017 07:01  -  14 Jul 2017 07:00  --------------------------------------------------------  IN: 120 mL / OUT: 1755 mL / NET: -1635 mL    14 Jul 2017 07:01  -  14 Jul 2017 15:37  --------------------------------------------------------  IN: 0 mL / OUT: 950 mL / NET: -950 mL        Thornton:  Central/PICC/Mid Line:                                         Appearance: Normal	  HEENT:   Normal oral mucosa, PERRL, EOMI	  Neck: Supple, + JVD/ - JVD; Carotid Bruit   Cardiovascular: Normal S1 S2, No JVD, No murmurs,   Respiratory: Lungs clear to auscultation/Decreased Breath Sounds/No Rales, Rhonchi, Wheezing	  Gastrointestinal:  Soft, Non-tender, + BS	  Skin: No rashes, No ecchymoses, No cyanosis  Extremities: Normal range of motion, No clubbing, cyanosis or edema  Vascular: Peripheral pulses palpable 2+ bilaterally  Neurologic: Non-focal  Psychiatry: A & O x 3, Mood & affect appropriate      	    ECG:  	  RADIOLOGY:   DIAGNOSTIC TESTING:  [ ] Echocardiogram:  [ ]  Catheterization:  [ ] Stress Test:    [ ] LAKEISHA  OTHER: 	    LABS:	 	  CARDIAC MARKERS:                        13.4   7.0   )-----------( 132      ( 14 Jul 2017 06:33 )             41.2     07-14    142  |  102  |  26<H>  ----------------------------<  89  3.6   |  22  |  1.60<H>    Ca    8.9      14 Jul 2017 06:32  Phos  4.3     07-14  Mg     1.8     07-14    TPro  6.9  /  Alb  3.4  /  TBili  1.2  /  DBili  x   /  AST  53<H>  /  ALT  75<H>  /  AlkPhos  80  07-14    proBNP:   Lipid Profile:   HgA1c:   TSH:       ASSESSMENT/PLAN: 	    61 year old M poor historian w/ PMH of CHF, HTN, atrial fibrillation s/p AF ablation with acute decompensated systolic CHF secondary to atrial flutter with rapid rates, non compliance and ? alcohol withdrawal    LAKEISHA shows no LA / GOLD thrombus - patient is now on AC - OK to continue Amiodarone / Cardiovert    LFTs trending down - likely secondary to alcohol use    RECOMMEND    - Continue AC and diuresis  - Amiodarone for rate control ? Cardioversion  - Long term - AFL ablation  - Monitor and treat for alcohol withdrawl  - Evaulation for alcoholism and non compliance    Patient will be moved to a cardiac telemetry floor with Dr. Bernal as the attending  Needs further optimization of CHF status over the weekend  Delirium likely secondary to drugs, hospitatlization and alcohol abuse - improving - continue monitoring

## 2017-07-14 NOTE — PROGRESS NOTE ADULT - ASSESSMENT
61 year old M w/ PMH of CHF, HTN, atrial fibrillation s/p ablation, HLD, EtOH abuse presented to [facility] ED w/ medication noncompliance x3 days and SOB x1 day most likely cardiac in origin 61 year old M w/ PMH of CHF, HTN, atrial fibrillation s/p ablation, HLD, EtOH abuse presented to [facility] ED w/ medication noncompliance x3 days and SOB x1 day most likely second to HF worsened by a-fib with RVR

## 2017-07-14 NOTE — PROGRESS NOTE ADULT - PROBLEM SELECTOR PLAN 3
History of CHF, EF unknown. BNP 4033.   -Echo: systolic HF with EF 15-20% Anxiety and symptoms may be from cardiac issues.   -Continue to monitor CIWA.    Ativan 2mg q8 PRN per psych. Anxiety and symptoms may be from cardiac issues.   -Continue to monitor CIWA.    Ativan 2mg q6 PRN per psych.

## 2017-07-14 NOTE — PROGRESS NOTE ADULT - PROBLEM SELECTOR PLAN 2
s/p ablation, managed by EP Dr. Kristofer Roberts, colleague Dr. Higuera is seeing patient   -amiodarone 200mg BID for now dosed at 10AM, 10PM, concern (LFT downtrending)  -Dig level 1.0 (7/14), will dose 0.25mg daily   -HR in 90's this AM (7/14)  -Xarelto 200mg for anticoagulation, Cr 1.7 (7/13), clearance 66ml/min Hx Afib ablation, managed by EP Dr. Kristofer Roberts, colleague Dr. Higuera is seeing patient   -S/p Amio drip, switched to PO Amiodarone 200mg BID per Dr. Higuera (LFT downtrending), cont to monitor LFTs  -Dig level 1.0 (7/14), c/w maintainence PO 0.25mg daily   -C/w Metoprolol 100mg BID  -Currently Afib rate controlled w/ HR in 70-90's today  -C/w Xarelto 20mg PO for anticoagulation, CrCl 66ml/min  -S/p LAKEISHA performed today w/ no LA clot. Plan for DCCV on Monday.

## 2017-07-14 NOTE — PROGRESS NOTE ADULT - PROBLEM SELECTOR PLAN 6
Anion gap metabolic acidosis possibly secondary to EtOH ketoacidosis vs elevated lactate.  -lactate 1.1 (7/12)  -consider ABG SBP 99-140s  -C/w lisinopril, metoprolol

## 2017-07-14 NOTE — PROGRESS NOTE ADULT - PROBLEM SELECTOR PLAN 1
Shortness of breath- acute decompensated systolic CHF secondary to atrial flutter with rapid rates, Echo showed systolic HF with EF of 15-20%   -Dr. Higuera following (colleagues with Dr. Sheth and Armando)   -LAKEISHA delayed for S&S eval, cleared, will be NPO tomorrow for LAKEISHA in AM  -metoprolol 100mg BID  -lisinopril 5mg once a day (home dose 2.5mg)   -increase to lasix 40mg IV BID, increased from 80mg oral at home   -amiodarone infusion: 05mg/min for total of 18 hours (started around 7AM)  digoxin: will get second dose of 0.25mg around 2PM, check dig level tomorrow AM  -check LFT 4PM- make sure amiodarone is not causing transaminitis ()   -concern retaining fluid in bladder, but bladder scan again today was almost 0ml (7/13)  -obtain LE doppler to rule out DVT Shortness of breath- acute decompensated systolic CHF secondary to atrial flutter with rapid rates, Echo showed systolic HF with EF of 15-20%, BNP 4033  -Dr. Higuera following (colleagues with Dr. Sheth and Armando)   -LAKEISHA delayed for S&S eval, cleared, will be NPO tomorrow for LAKEISHA in AM  -metoprolol 100mg BID  -lisinopril 5mg once a day (home dose 2.5mg)   -increase to lasix 40mg IV BID, increased from 80mg oral at home   -amiodarone: 400mg BID for now(LFT trending down)  digoxin: will dose based on AM level  -concern retaining fluid in bladder, but bladder scan again today was almost 0ml (7/13) Shortness of breath- acute decompensated systolic CHF secondary to atrial flutter with rapid rates, Echo showed systolic HF with EF of 15-20%, BNP 4033  -Dr. Higuera following (colleagues with Dr. Sheth and Armando)   -LAKEISHA delayed for S&S eval, cleared, will be NPO tomorrow for LAKEISHA in AM  -metoprolol 100mg BID  -lisinopril 5mg once a day (home dose 2.5mg)   -increase to lasix 40mg IV BID, increased from 80mg oral at home   -amiodarone: 200mgBID per Dr. Higuera (LFT trending down)  digoxin: level 1.0, will dose 0.25mg daily  -concern retaining fluid in bladder, but bladder scan again today was almost 0ml (7/13) Shortness of breath- acute decompensated systolic CHF secondary to atrial flutter with rapid rates, Echo showed systolic HF with EF of 15-20%, BNP 4033  -Dr. Higuera following (colleagues with Dr. Sheth and Armando)   -LAKEISHA performed today, patient will follow-up out patient for an ablation for a-fib  -metoprolol 100mg BID  -lisinopril 5mg once a day (home dose 2.5mg)   -increase to lasix 40mg IV BID, increased from 80mg oral at home   -amiodarone: 200mgBID per Dr. Higuera (LFT trending down)  digoxin: level 1.0, will dose 0.25mg daily  -concern retaining fluid in bladder, but bladder scan again today was almost 0ml (7/13)

## 2017-07-14 NOTE — PHYSICAL THERAPY INITIAL EVALUATION ADULT - PERTINENT HX OF CURRENT PROBLEM, REHAB EVAL
61 year old M w/ PMH of CHF, HTN, atrial fibrillation s/p ablation, HLD, EtOH abuse presented to [facility] ED w/ medication noncompliance x3 days and SOB x1 day most likely second to HF worsened by a-fib with RVR

## 2017-07-14 NOTE — PROGRESS NOTE ADULT - PROBLEM SELECTOR PLAN 3
Anxiety and symptoms may be from cardiac issues.   -Continue to monitor CIWA.    Ativan 2mg q6 PRN per psych. Anxiety and symptoms may be from cardiac issues.   -Psych consulted, F/u recs.  -Continue to monitor CIWA. CIWA score 5 today.   -S/p Ativan 2mg IV x 2 yesterday.   -Placed on standing PO Ativan 2mg q8h per psych.

## 2017-07-14 NOTE — PROGRESS NOTE BEHAVIORAL HEALTH - NSBHFUPINTERVALHXFT_PSY_A_CORE
On presentation today, pt is noticeably less sedated and answers questions appropriately. Denies anxiety at present, but does states he is concerned about his housing situation upon discharge. Of note, Librium 75 mg q8h was discontinued yesterday and since then pt has required 2 doses of Ativan 2 mg IV. At this time pt denies discomfort or s/s of ETOH WD. Option for inpatient rehab was once again discussed and pt continues to appear motivated towards this dispo. He continues to deny neurovegetative sx depression or sx of psychosis/ farnaz. Denies SI/HI/AH/VH.

## 2017-07-14 NOTE — PROGRESS NOTE BEHAVIORAL HEALTH - NSBHCHARTREVIEWLAB_PSY_A_CORE FT
07-14    142  |  102  |  26<H>  ----------------------------<  89  3.6   |  22  |  1.60<H>    Ca    8.9      14 Jul 2017 06:32  Phos  4.3     07-14  Mg     1.8     07-14    TPro  6.9  /  Alb  3.4  /  TBili  1.2  /  DBili  x   /  AST  53<H>  /  ALT  75<H>  /  AlkPhos  80  07-14

## 2017-07-14 NOTE — PROGRESS NOTE ADULT - SUBJECTIVE AND OBJECTIVE BOX
CARDIOLOGY NP TRANSFER ACCEPTANCE NOTE    Hospital Course:  61 year old M w/ PMH of CHF, HTN, atrial fibrillation s/p ablation, HLD, ETOH abuse presented to [facility] ED w/ SOB. Patient states that he ran out of his medications this past Sunday and started to feel SOB the night before admission. Treated before at outside hospitals for similar events. He also has been drinking a pint of vodka for the last 4 years daily. His last drink was 7/10 at 8pm. He denies a history of alcohol withdrawal seizures.  Hospital course was complicated by A-flutter and then A-fib with RVR with HR up to 150, which was treated with multiple medications and finally controlled with metoprolol, digoxin, and amiodarone. Patient is still in A-fib, but heart rate has been fairly well controlled (). There was a concern patient was in alcohol withdrawal upon admission because patient was very agitated and diaphoretic, but consensus is that most symptoms probably stemming from CHF exacerbation. Patient also has anxiety and other psychiatric issues and is followed by psychiatry who recommended ativan 2mg q6h. Patient also has social issues and is concerned about being homeless. He is transferred from [facility] to telemetry 5 [facility] for further management of acute on chronic systolic CHF exacerbation.      Subjective: Pt seen and examined at bedside.     Overnight Events:     TELEMETRY:    EKG:      VITAL SIGNS:  T(C): 36.3 (07-14-17 @ 08:52), Max: 37.4 (07-14-17 @ 05:44)  HR: 88 (07-14-17 @ 16:35) (80 - 106)  BP: 99/68 (07-14-17 @ 16:35) (99/68 - 145/67)  RR: 18 (07-14-17 @ 16:35) (18 - 20)  SpO2: 99% (07-14-17 @ 16:35) (93% - 99%)  Wt(kg): --    I&O's Summary    13 Jul 2017 07:01  -  14 Jul 2017 07:00  --------------------------------------------------------  IN: 120 mL / OUT: 1755 mL / NET: -1635 mL    14 Jul 2017 07:01  -  14 Jul 2017 16:59  --------------------------------------------------------  IN: 0 mL / OUT: 950 mL / NET: -950 mL          PHYSICAL EXAM:    General: A/ox 3, No acute Distress  Neck: Supple, NO JVD  Cardiac: S1 S2, No M/R/G  Pulmonary: CTAB, Breathing unlabored, No Rhonchi/Rales/Wheezing  Abdomen: Soft, Non -tender, +BS x 4 quads  Extremities: No Rashes, No edema  Neuro: A/o x 3, No focal deficits          LABS:                          13.4   7.0   )-----------( 132      ( 14 Jul 2017 06:33 )             41.2                              07-14    142  |  102  |  26<H>  ----------------------------<  89  3.6   |  22  |  1.60<H>    Ca    8.9      14 Jul 2017 06:32  Phos  4.3     07-14  Mg     1.8     07-14    TPro  6.9  /  Alb  3.4  /  TBili  1.2  /  DBili  x   /  AST  53<H>  /  ALT  75<H>  /  AlkPhos  80  07-14    LIVER FUNCTIONS - ( 14 Jul 2017 06:32 )  Alb: 3.4 g/dL / Pro: 6.9 g/dL / ALK PHOS: 80 U/L / ALT: 75 U/L / AST: 53 U/L / GGT: x                                   CAPILLARY BLOOD GLUCOSE                  No Known Allergies    MEDICATIONS  (STANDING):  rivaroxaban 20 milliGRAM(s) Oral daily  furosemide   Injectable 40 milliGRAM(s) IV Push two times a day  metoprolol 100 milliGRAM(s) Oral two times a day  amiodarone    Tablet 200 milliGRAM(s) Oral two times a day  digoxin     Tablet 0.125 milliGRAM(s) Oral daily    MEDICATIONS  (PRN):  LORazepam   Injectable 2 milliGRAM(s) IV Push every 6 hours PRN Agitation/anxiety        DIAGNOSTIC TESTS: CARDIOLOGY NP TRANSFER ACCEPTANCE NOTE    Hospital Course:  61 year old M w/ PMH of CHF, HTN, atrial fibrillation s/p ablation, HLD, ETOH abuse presented to [facility] ED w/ SOB. Patient states that he ran out of his medications this past Sunday and started to feel SOB the night before admission. Treated before at outside hospitals for similar events. He also has been drinking a pint of vodka for the last 4 years daily. His last drink was 7/10 at 8pm. He denies a history of alcohol withdrawal seizures.  Hospital course was complicated by A-flutter and then A-fib with RVR with HR up to 150, which was treated with multiple medications and finally controlled with metoprolol, digoxin, and amiodarone. Patient is still in A-fib, but heart rate has been fairly well controlled (). There was a concern patient was in alcohol withdrawal upon admission because patient was very agitated and diaphoretic, but consensus is that most symptoms probably stemming from CHF exacerbation. Patient also has anxiety and other psychiatric issues and is followed by psychiatry who recommended ativan 2mg q6h. Patient also has social issues and is concerned about being homeless. He is transferred from [facility] to telemetry 5 [facility] for further management of acute on chronic systolic CHF exacerbation in setting of Afib with RVR.      Subjective: Pt seen and examined at bedside.     Overnight Events: Switched Amiodarone IV to PO    TELEMETRY: Afib 70-90s          VITAL SIGNS:  T(C): 36.3 (07-14-17 @ 08:52), Max: 37.4 (07-14-17 @ 05:44)  HR: 88 (07-14-17 @ 16:35) (80 - 106)  BP: 99/68 (07-14-17 @ 16:35) (99/68 - 145/67)  RR: 18 (07-14-17 @ 16:35) (18 - 20)  SpO2: 99% (07-14-17 @ 16:35) (93% - 99%)  Wt(kg): --    I&O's Summary    13 Jul 2017 07:01  -  14 Jul 2017 07:00  --------------------------------------------------------  IN: 120 mL / OUT: 1755 mL / NET: -1635 mL    14 Jul 2017 07:01  -  14 Jul 2017 16:59  --------------------------------------------------------  IN: 0 mL / OUT: 950 mL / NET: -950 mL          PHYSICAL EXAM:    General: A/ox 3, No acute Distress  Neck: Supple, NO JVD  Cardiac: S1 S2, No M/R/G  Pulmonary: CTAB, Breathing unlabored, No Rhonchi/Rales/Wheezing  Abdomen: Soft, Non-tender, +BS x 4 quads  Extremities: No Rashes, No LE edema  Neuro: A/o x 3, No focal deficits          LABS:                          13.4   7.0   )-----------( 132      ( 14 Jul 2017 06:33 )             41.2                              07-14    142  |  102  |  26<H>  ----------------------------<  89  3.6   |  22  |  1.60<H>    Ca    8.9      14 Jul 2017 06:32  Phos  4.3     07-14  Mg     1.8     07-14    TPro  6.9  /  Alb  3.4  /  TBili  1.2  /  DBili  x   /  AST  53<H>  /  ALT  75<H>  /  AlkPhos  80  07-14    LIVER FUNCTIONS - ( 14 Jul 2017 06:32 )  Alb: 3.4 g/dL / Pro: 6.9 g/dL / ALK PHOS: 80 U/L / ALT: 75 U/L / AST: 53 U/L / GGT: x                                   CAPILLARY BLOOD GLUCOSE                  No Known Allergies    MEDICATIONS  (STANDING):  rivaroxaban 20 milliGRAM(s) Oral daily  furosemide   Injectable 40 milliGRAM(s) IV Push two times a day  metoprolol 100 milliGRAM(s) Oral two times a day  amiodarone    Tablet 200 milliGRAM(s) Oral two times a day  digoxin     Tablet 0.125 milliGRAM(s) Oral daily    MEDICATIONS  (PRN):  LORazepam   Injectable 2 milliGRAM(s) IV Push every 6 hours PRN Agitation/anxiety        DIAGNOSTIC TESTS:     LAKEISHA 7/14/17: There is severe global hypokinesis of the left ventricle.The left ventricular ejection fraction is severely reduced.The left atrium is dilated.No clot seen in the left atrium or in the left atrial appendage.Left atrial appendage emptying velocities are reduced.Right atrial size is normal.The right ventricle is normal in size and function.Structurally normal aortic valve.There is mild to moderate aortic regurgitation.Structurally normal mitral valve.There is moderate mitral regurgitation.Structurally normal tricuspid valve.No aortic root   dilatation.Mild atherosclerotic plaque(s) in the aortic arch.Mild   atherosclerotic plaque(s) in the descending aorta.There is no pericardial   effusion. CARDIOLOGY NP TRANSFER ACCEPTANCE NOTE    Hospital Course:  61 year old M w/ PMH of CHF, HTN, atrial fibrillation s/p ablation, HLD, ETOH abuse presented to [facility] ED w/ SOB. Patient states that he ran out of his medications this past Sunday and started to feel SOB the night before admission. Treated before at outside hospitals for similar events. He also has been drinking a pint of vodka for the last 4 years daily. His last drink was 7/10 at 8pm. He denies a history of alcohol withdrawal seizures.  Hospital course was complicated by A-flutter and then A-fib with RVR with HR up to 150, which was treated with multiple medications and finally controlled with metoprolol, digoxin, and amiodarone. Patient is still in A-fib, but heart rate has been fairly well controlled (). There was a concern patient was in alcohol withdrawal upon admission because patient was very agitated and diaphoretic, but consensus is that most symptoms probably stemming from CHF exacerbation. Patient also has anxiety and other psychiatric issues and is followed by psychiatry who recommended ativan 2mg q6h. Patient also has social issues and is concerned about being homeless. He is transferred from [facility] to telemetry 5 [facility] for further management of acute on chronic systolic CHF exacerbation in setting of Afib with RVR.      Subjective: Pt seen and examined at bedside.     Overnight Events: Switched Amiodarone IV to PO    TELEMETRY: Afib 70-90s          VITAL SIGNS:  T(C): 36.3 (07-14-17 @ 08:52), Max: 37.4 (07-14-17 @ 05:44)  HR: 88 (07-14-17 @ 16:35) (80 - 106)  BP: 99/68 (07-14-17 @ 16:35) (99/68 - 145/67)  RR: 18 (07-14-17 @ 16:35) (18 - 20)  SpO2: 99% (07-14-17 @ 16:35) (93% - 99%)  Wt(kg): --    I&O's Summary    13 Jul 2017 07:01  -  14 Jul 2017 07:00  --------------------------------------------------------  IN: 120 mL / OUT: 1755 mL / NET: -1635 mL    14 Jul 2017 07:01  -  14 Jul 2017 16:59  --------------------------------------------------------  IN: 0 mL / OUT: 950 mL / NET: -950 mL          PHYSICAL EXAM:    General: A/ox 3, No acute Distress  Neck: Supple, NO JVD  Cardiac: S1 S2, No M/R/G  Pulmonary: CTAB, Breathing unlabored, No Rhonchi/Rales/Wheezing  Abdomen: Soft, Non-tender, +BS x 4 quads  Extremities: No Rashes, No LE edema  Neuro: A/o x 3, No focal deficits          LABS:                          13.4   7.0   )-----------( 132      ( 14 Jul 2017 06:33 )             41.2                              07-14    142  |  102  |  26<H>  ----------------------------<  89  3.6   |  22  |  1.60<H>    Ca    8.9      14 Jul 2017 06:32  Phos  4.3     07-14  Mg     1.8     07-14    TPro  6.9  /  Alb  3.4  /  TBili  1.2  /  DBili  x   /  AST  53<H>  /  ALT  75<H>  /  AlkPhos  80  07-14    LIVER FUNCTIONS - ( 14 Jul 2017 06:32 )  Alb: 3.4 g/dL / Pro: 6.9 g/dL / ALK PHOS: 80 U/L / ALT: 75 U/L / AST: 53 U/L / GGT: x                                   CAPILLARY BLOOD GLUCOSE                  No Known Allergies    MEDICATIONS  (STANDING):  rivaroxaban 20 milliGRAM(s) Oral daily  furosemide   Injectable 40 milliGRAM(s) IV Push two times a day  metoprolol 100 milliGRAM(s) Oral two times a day  amiodarone    Tablet 200 milliGRAM(s) Oral two times a day  digoxin     Tablet 0.125 milliGRAM(s) Oral daily    MEDICATIONS  (PRN):  LORazepam   Injectable 2 milliGRAM(s) IV Push every 6 hours PRN Agitation/anxiety        DIAGNOSTIC TESTS:     LAKEISHA 7/14/17: There is severe global hypokinesis of the left ventricle.The left ventricular ejection fraction is severely reduced.The left atrium is dilated.No clot seen in the left atrium or in the left atrial appendage.Left atrial appendage emptying velocities are reduced.Right atrial size is normal.The right ventricle is normal in size and function.Structurally normal aortic valve.There is mild to moderate aortic regurgitation.Structurally normal mitral valve.There is moderate mitral regurgitation.Structurally normal tricuspid valve.No aortic root   dilatation.Mild atherosclerotic plaque(s) in the aortic arch.Mild   atherosclerotic plaque(s) in the descending aorta.There is no pericardial   effusion.    Michael LE Doppler 7/14/17: No deep vein thrombosis seen.    Abd US 7/14/17: Hepatic steatosis. Partially contracted gallbladder. CARDIOLOGY NP TRANSFER ACCEPTANCE NOTE    Hospital Course:  61 year old M w/ PMH of CHF, HTN, atrial fibrillation s/p ablation, HLD, ETOH abuse presented to [facility] ED w/ SOB. Patient states that he ran out of his medications this past Sunday and started to feel SOB the night before admission. Treated before at outside hospitals for similar events. He also has been drinking a pint of vodka for the last 4 years daily. His last drink was 7/10 at 8pm. He denies a history of alcohol withdrawal seizures.  Hospital course was complicated by A-flutter and then A-fib with RVR with HR up to 150, which was treated with multiple medications and finally controlled with metoprolol, digoxin, and amiodarone. Patient is still in A-fib, but heart rate has been fairly well controlled (). There was a concern patient was in alcohol withdrawal upon admission because patient was very agitated and diaphoretic, but consensus is that most symptoms probably stemming from CHF exacerbation. Patient also has anxiety and other psychiatric issues and is followed by psychiatry who recommended ativan 2mg q6h. Patient also has social issues and is concerned about being homeless. He is transferred from [facility] to telemetry 5 [facility] for further management of acute on chronic systolic CHF exacerbation in setting of Afib with RVR.      Subjective: Pt seen and examined at bedside.     Overnight Events: Switched Amiodarone IV to PO    TELEMETRY: Afib 70-90s          VITAL SIGNS:  T(C): 36.3 (07-14-17 @ 08:52), Max: 37.4 (07-14-17 @ 05:44)  HR: 88 (07-14-17 @ 16:35) (80 - 106)  BP: 99/68 (07-14-17 @ 16:35) (99/68 - 145/67)  RR: 18 (07-14-17 @ 16:35) (18 - 20)  SpO2: 99% (07-14-17 @ 16:35) (93% - 99%)  Wt(kg): --    I&O's Summary    13 Jul 2017 07:01  -  14 Jul 2017 07:00  --------------------------------------------------------  IN: 120 mL / OUT: 1755 mL / NET: -1635 mL    14 Jul 2017 07:01  -  14 Jul 2017 16:59  --------------------------------------------------------  IN: 0 mL / OUT: 950 mL / NET: -950 mL          PHYSICAL EXAM:    General: A/ox 3, No acute Distress  Neck: Supple, NO JVD  Cardiac: S1 S2, No M/R/G  Pulmonary: Diminished michael bases, Breathing unlabored, No Rhonchi/Rales/Wheezing  Abdomen: Soft, Non-tender, +BS x 4 quads  Extremities: No Rashes, No LE edema  Neuro: A/o x 3, No focal deficits, visible tremors w/ michael arms extended          LABS:                          13.4   7.0   )-----------( 132      ( 14 Jul 2017 06:33 )             41.2                              07-14    142  |  102  |  26<H>  ----------------------------<  89  3.6   |  22  |  1.60<H>    Ca    8.9      14 Jul 2017 06:32  Phos  4.3     07-14  Mg     1.8     07-14    TPro  6.9  /  Alb  3.4  /  TBili  1.2  /  DBili  x   /  AST  53<H>  /  ALT  75<H>  /  AlkPhos  80  07-14    LIVER FUNCTIONS - ( 14 Jul 2017 06:32 )  Alb: 3.4 g/dL / Pro: 6.9 g/dL / ALK PHOS: 80 U/L / ALT: 75 U/L / AST: 53 U/L / GGT: x                                   CAPILLARY BLOOD GLUCOSE                  No Known Allergies    MEDICATIONS  (STANDING):  rivaroxaban 20 milliGRAM(s) Oral daily  furosemide   Injectable 40 milliGRAM(s) IV Push two times a day  metoprolol 100 milliGRAM(s) Oral two times a day  amiodarone    Tablet 200 milliGRAM(s) Oral two times a day  digoxin     Tablet 0.125 milliGRAM(s) Oral daily    MEDICATIONS  (PRN):  LORazepam   Injectable 2 milliGRAM(s) IV Push every 6 hours PRN Agitation/anxiety        DIAGNOSTIC TESTS:     LAKEISHA 7/14/17: There is severe global hypokinesis of the left ventricle.The left ventricular ejection fraction is severely reduced.The left atrium is dilated.No clot seen in the left atrium or in the left atrial appendage.Left atrial appendage emptying velocities are reduced.Right atrial size is normal.The right ventricle is normal in size and function.Structurally normal aortic valve.There is mild to moderate aortic regurgitation.Structurally normal mitral valve.There is moderate mitral regurgitation.Structurally normal tricuspid valve.No aortic root   dilatation.Mild atherosclerotic plaque(s) in the aortic arch.Mild   atherosclerotic plaque(s) in the descending aorta.There is no pericardial   effusion.    Michael LE Doppler 7/14/17: No deep vein thrombosis seen.    Abd US 7/14/17: Hepatic steatosis. Partially contracted gallbladder.

## 2017-07-14 NOTE — PROGRESS NOTE ADULT - PROBLEM SELECTOR PLAN 2
s/p ablation, managed by EP Dr. Kristofer Roberts, colleague Dr. Higuera is seeing patient   -amiodarone infusion: 05mg/min for total of 18 hours (started around 7AM), concern -RVR in 110's this morning   for transaminitis, will check LFT at 2PM today   -Xarelto 200mg for anticoagulation, home dose per Dr. Sheth  -check creatinine 2PM today, increase from 1.4-1.7 - may have to adjust Xarelto s/p ablation, managed by EP Dr. Kristofer Roberts, colleague Dr. Higuera is seeing patient   -amiodarone 400mg BID for now dosed at 10AM, 10PM, concern (LFT downtrending)  -HR in 90's this AM (7/14)  -Xarelto 200mg for anticoagulation, Cr 1.7, clearance 66ml/min s/p ablation, managed by EP Dr. Kristofer Roberts, colleague Dr. Higuera is seeing patient   -amiodarone 200mg BID for now dosed at 10AM, 10PM, concern (LFT downtrending)  -Dig level 1.0 (7/14), will dose 0.25mg daily   -HR in 90's this AM (7/14)  -Xarelto 200mg for anticoagulation, Cr 1.7, clearance 66ml/min s/p ablation, managed by EP Dr. Kristofer Roberts, colleague Dr. Higuera is seeing patient   -amiodarone 200mg BID for now dosed at 10AM, 10PM, concern (LFT downtrending)  -Dig level 1.0 (7/14), will dose 0.25mg daily   -HR in 90's this AM (7/14)  -Xarelto 200mg for anticoagulation, Cr 1.7 (7/13), clearance 66ml/min

## 2017-07-14 NOTE — PROGRESS NOTE BEHAVIORAL HEALTH - RISK ASSESSMENT
Pt is at low/ moderate risk given psychosocial stressors, ongoing substance use, and poor medication compliance. He does appear future oriented, denies SI, and identifies family as a primary reason for maintaining his health.

## 2017-07-14 NOTE — PHYSICAL THERAPY INITIAL EVALUATION ADULT - IMPAIRMENTS FOUND, PT EVAL
muscle strength/gait, locomotion, and balance/aerobic capacity/endurance/poor safety awareness/arousal, attention, and cognition

## 2017-07-14 NOTE — PROGRESS NOTE ADULT - PROBLEM SELECTOR PLAN 1
Shortness of breath- acute decompensated systolic CHF secondary to atrial flutter with rapid rates, Echo showed systolic HF with EF of 15-20%, BNP 4033  -Dr. Higuera following (colleagues with Dr. Sheth and Amrando)   -LAKEISHA performed today, patient will follow-up out patient for an ablation for A-fib  -Metoprolol 100mg BID  -lisinopril 5mg once a day (home dose 2.5mg)   -Increased to lasix 40mg IV BID today (increased from 80mg oral at home)  -amiodarone: 200mgBID per Dr. Higuera (LFT trending down)  digoxin: level 1.0, will dose 0.25mg daily  -concern retaining fluid in bladder, but bladder scan again today was almost 0ml (7/13) SOB - acute decompensated systolic CHF secondary to atrial fib with rapid rates, Echo showed systolic HF with EF of 15-20%. BNP 4033. Trop mildly elevated 0.04 likely in setting of acute CHF exac.  -EP Dr. Higuera following for Dr Roberts. F/u recs  -LAKEISHA performed today, patient will follow-up outpatient for possible for A-fib ablation.  -C/w Metoprolol 100mg BID  -C/w Lisinopril 5mg once a day (home dose 2.5mg)   -Increased to lasix 40mg IV BID today (80mg PO at home)  -Amiodarone 200mgBID per Dr. Higuera (LFT trending down)  -digoxin: level 1.0, will dose maintenance 0.25mg daily  -concern retaining fluid in bladder, but bladder scan again today was almost 0ml (7/13) C/o SOB for few days on admission w/ LOPE[facility]. BNP 4033. Etiology likely Acute on chronic systolic CHF likely secondary to medication noncompliance, Afib w/ -120s.  -Trop mildly elevated 0.04 likely in setting of acute CHF exac, have trended down.  -EP Dr. Higuera following for Dr Roberts. F/u recs  -Echo performed 7/12/17 w/ EF 15-20%, mod dilated LV, severe global hypokinesis, mild-mod MR, mild TR.  -CXR w/ pulm vasc congestion  -C/w Metoprolol 100mg BID  -C/w Lisinopril 5mg once a day (home dose 2.5mg)   -Increased to lasix 40mg IV BID today (80mg PO at home)  -S/p Amio drip, switched to PO Amiodarone 200mg BID per Dr. Higuera (LFT trending down)  -S/p Digoxin load today: Dig level 1. Cont PO maintenance 0.25mg daily  -Monitor strict I/Os, daily weights  -Currently on 2L NC, Wean off O2 as tolerated

## 2017-07-14 NOTE — PROGRESS NOTE ADULT - PROBLEM SELECTOR PLAN 4
Vinh on board  -periods of delirium,   -patient concerned about housing  -patient wants to go to alcohol rehab

## 2017-07-14 NOTE — PHYSICAL THERAPY INITIAL EVALUATION ADULT - SHORT TERM MEMORY, REHAB EVAL
Pt unable to recall 3 words after 3 minutes; words were "cow, book, tree", pt's response was "cat, house, I can't remember the last one"

## 2017-07-14 NOTE — PROGRESS NOTE ADULT - ASSESSMENT
61 year old M w/ PMH of CHF, HTN, atrial fibrillation s/p ablation, HLD, EtOH abuse presented to [facility] ED w/ medication noncompliance x3 days and SOB x1 day most likely second to acute on chronic CHF in setting of A-fib with RVR

## 2017-07-14 NOTE — PROGRESS NOTE ADULT - PROBLEM SELECTOR PLAN 8
on Xarelto 20mg DVT PPX: Xarelto 20mg Dash/TLC Diet, replete lytes PRN, keep K >4, Mg >2    FULL CODE

## 2017-07-14 NOTE — PROGRESS NOTE ADULT - PROBLEM SELECTOR PLAN 6
Anion gap metabolic acidosis possibly secondary to EtOH ketoacidosis vs elevated lactate.  -lactate 1.1 (7/12)  -consider ABG

## 2017-07-14 NOTE — PHYSICAL THERAPY INITIAL EVALUATION ADULT - ADDITIONAL COMMENTS
Per pt report he lives in 4th floor apartment with elevator and 2 steps to enter from the outside. States that he owns a RW that he uses occasionally for ambulation.

## 2017-07-14 NOTE — PROGRESS NOTE ADULT - PROBLEM SELECTOR PLAN 9
- replete electrolytes as necessary,   -Dash Diet Discharge pending clinical progress    Social work consult

## 2017-07-14 NOTE — PROGRESS NOTE ADULT - PROBLEM SELECTOR PLAN 5
Troponin elevated at 0.04   -downtrended to 0.03 Anion gap metabolic acidosis possibly secondary to EtOH ketoacidosis vs elevated lactate.  -lactate 1.1 (7/12)  -consider ABG

## 2017-07-14 NOTE — PROGRESS NOTE BEHAVIORAL HEALTH - SUMMARY
62 y/o M with long standing h/o ETOH use and multiple cardiac comorbidities, currently admitted for treatment of SOB in the setting of medication noncompliance. Initial psychiatry consult requested for evaluation of anxiety. While pt may suffer from baseline levels anxiety, formal diagnosis is limited by pt's ongoing substance use. Current feelings of "stress" appear related to impending homelessness. Options for inpatient rehab have been discussed and pt appears amendable. Given h/o significant daily ETOH consumption would recommend standing Ativan with additional Ativan PRN available.         Pt reports baseline levels of anxiety that have increased recently in the setting of psychosocial stressors, specifically impending homelessness as pt will need to move out of his current housing arrangement within 2 days. While it is possible that pt may suffer from an underlying anxiety disorder, this diagnosis cannot be made in the setting of ongoing substance use. Furthermore, given waxing and waning levels of lucidity, there is likely a component of delirium at present. Pt is, however, able to begin discussion about referral to inpatient rehab, to which he appears amenable at this time. However, pt's motivation towards rehab appears rooted in the need for housing as opposed to will to for alcohol cessation. Will continue to follow-up with pt regarding rehab. Recommend SW involvement to help coordinate dispo. Regarding management of ETOH WD, given elevated LFTs and oversedation at present, would switch standing Librium to standing Ativan with Ativan PRN 60 y/o M with long standing h/o ETOH use and multiple cardiac comorbidities, currently admitted for treatment of SOB in the setting of medication noncompliance. Initial psychiatry consult requested for evaluation of anxiety. While pt may suffer from baseline levels anxiety, formal diagnosis is limited by pt's ongoing substance use. Current feelings of "stress" appear related to impending homelessness. Options for inpatient rehab have been discussed and pt appears amendable. Given h/o significant daily ETOH consumption would recommend standing Ativan with additional Ativan PRN available.

## 2017-07-15 LAB
ALBUMIN SERPL ELPH-MCNC: 3.9 G/DL — SIGNIFICANT CHANGE UP (ref 3.3–5)
ALP SERPL-CCNC: 82 U/L — SIGNIFICANT CHANGE UP (ref 40–120)
ALT FLD-CCNC: 58 U/L — HIGH (ref 10–45)
ANION GAP SERPL CALC-SCNC: 19 MMOL/L — HIGH (ref 5–17)
AST SERPL-CCNC: 35 U/L — SIGNIFICANT CHANGE UP (ref 10–40)
BILIRUB SERPL-MCNC: 1.6 MG/DL — HIGH (ref 0.2–1.2)
BUN SERPL-MCNC: 31 MG/DL — HIGH (ref 7–23)
CALCIUM SERPL-MCNC: 9.1 MG/DL — SIGNIFICANT CHANGE UP (ref 8.4–10.5)
CHLORIDE SERPL-SCNC: 100 MMOL/L — SIGNIFICANT CHANGE UP (ref 96–108)
CO2 SERPL-SCNC: 20 MMOL/L — LOW (ref 22–31)
CREAT SERPL-MCNC: 1.6 MG/DL — HIGH (ref 0.5–1.3)
GLUCOSE SERPL-MCNC: 106 MG/DL — HIGH (ref 70–99)
HCT VFR BLD CALC: 41.6 % — SIGNIFICANT CHANGE UP (ref 39–50)
HGB BLD-MCNC: 13.7 G/DL — SIGNIFICANT CHANGE UP (ref 13–17)
MAGNESIUM SERPL-MCNC: 1.6 MG/DL — SIGNIFICANT CHANGE UP (ref 1.6–2.6)
MCHC RBC-ENTMCNC: 31.5 PG — SIGNIFICANT CHANGE UP (ref 27–34)
MCHC RBC-ENTMCNC: 32.9 G/DL — SIGNIFICANT CHANGE UP (ref 32–36)
MCV RBC AUTO: 95.6 FL — SIGNIFICANT CHANGE UP (ref 80–100)
PLATELET # BLD AUTO: 174 K/UL — SIGNIFICANT CHANGE UP (ref 150–400)
POTASSIUM SERPL-MCNC: 4.1 MMOL/L — SIGNIFICANT CHANGE UP (ref 3.5–5.3)
POTASSIUM SERPL-SCNC: 4.1 MMOL/L — SIGNIFICANT CHANGE UP (ref 3.5–5.3)
PROT SERPL-MCNC: 8 G/DL — SIGNIFICANT CHANGE UP (ref 6–8.3)
RBC # BLD: 4.35 M/UL — SIGNIFICANT CHANGE UP (ref 4.2–5.8)
RBC # FLD: 15.5 % — SIGNIFICANT CHANGE UP (ref 10.3–16.9)
SODIUM SERPL-SCNC: 139 MMOL/L — SIGNIFICANT CHANGE UP (ref 135–145)
WBC # BLD: 9.4 K/UL — SIGNIFICANT CHANGE UP (ref 3.8–10.5)
WBC # FLD AUTO: 9.4 K/UL — SIGNIFICANT CHANGE UP (ref 3.8–10.5)

## 2017-07-15 RX ORDER — MAGNESIUM OXIDE 400 MG ORAL TABLET 241.3 MG
400 TABLET ORAL ONCE
Qty: 0 | Refills: 0 | Status: COMPLETED | OUTPATIENT
Start: 2017-07-15 | End: 2017-07-15

## 2017-07-15 RX ORDER — LISINOPRIL 2.5 MG/1
1 TABLET ORAL
Qty: 0 | Refills: 0 | COMMUNITY

## 2017-07-15 RX ORDER — FUROSEMIDE 40 MG
1 TABLET ORAL
Qty: 0 | Refills: 0 | COMMUNITY

## 2017-07-15 RX ORDER — FUROSEMIDE 40 MG
40 TABLET ORAL EVERY 12 HOURS
Qty: 0 | Refills: 0 | Status: DISCONTINUED | OUTPATIENT
Start: 2017-07-15 | End: 2017-07-16

## 2017-07-15 RX ORDER — ACETAMINOPHEN 500 MG
650 TABLET ORAL ONCE
Qty: 0 | Refills: 0 | Status: COMPLETED | OUTPATIENT
Start: 2017-07-15 | End: 2017-07-15

## 2017-07-15 RX ADMIN — AMIODARONE HYDROCHLORIDE 200 MILLIGRAM(S): 400 TABLET ORAL at 18:04

## 2017-07-15 RX ADMIN — AMIODARONE HYDROCHLORIDE 200 MILLIGRAM(S): 400 TABLET ORAL at 05:16

## 2017-07-15 RX ADMIN — Medication 40 MILLIGRAM(S): at 05:15

## 2017-07-15 RX ADMIN — LISINOPRIL 10 MILLIGRAM(S): 2.5 TABLET ORAL at 05:16

## 2017-07-15 RX ADMIN — MAGNESIUM OXIDE 400 MG ORAL TABLET 400 MILLIGRAM(S): 241.3 TABLET ORAL at 08:00

## 2017-07-15 RX ADMIN — Medication 100 MILLIGRAM(S): at 21:12

## 2017-07-15 RX ADMIN — RIVAROXABAN 20 MILLIGRAM(S): KIT at 12:26

## 2017-07-15 RX ADMIN — Medication 0.12 MILLIGRAM(S): at 05:16

## 2017-07-15 RX ADMIN — Medication 2 MILLIGRAM(S): at 14:51

## 2017-07-15 RX ADMIN — Medication 650 MILLIGRAM(S): at 10:53

## 2017-07-15 RX ADMIN — Medication 2 MILLIGRAM(S): at 05:16

## 2017-07-15 RX ADMIN — Medication 650 MILLIGRAM(S): at 12:23

## 2017-07-15 RX ADMIN — Medication 40 MILLIGRAM(S): at 18:04

## 2017-07-15 RX ADMIN — Medication 100 MILLIGRAM(S): at 10:54

## 2017-07-15 NOTE — PROGRESS NOTE ADULT - ASSESSMENT
61 year old M w/ PMH of CHF, HTN, atrial fibrillation s/p ablation, HLD, EtOH abuse presented to [facility] ED w/ medication noncompliance x3 days and SOB x1 day most likely second to acute on chronic CHF in setting of A-fib with RVR 61 year old M w/ PMH of CHF, HTN, atrial fibrillation s/p ablation, HLD, EtOH abuse presented to [facility] ED w/ medication noncompliance x3 days and SOB x1 day most likely 2/2 to acute on chronic systolic CHF exacerbation in setting of A-fib with RVR and medication non-compliance.

## 2017-07-15 NOTE — PROGRESS NOTE ADULT - PROBLEM SELECTOR PLAN 1
C/o SOB for few days on admission w/ [facility]OPEZ. BNP 4033. Etiology likely Acute on chronic systolic CHF likely secondary to medication noncompliance, Afib w/ -120s.  -Trop mildly elevated 0.04 likely in setting of acute systolic CHF exac, have trended down.  -EP Dr. Higuera following for Dr Roberts. F/u recs  -Echo performed 7/12/17 w/ EF 15-20%, mod dilated LV, severe global hypokinesis, mild-mod MR, mild TR.  -CXR w/ pulm vasc congestion  -C/w Metoprolol 100mg BID  -C/w Lisinopril 5mg once a day (home dose 2.5mg)   -S/lp Lasix 40mg IV BID yes (80mg PO at home)  -S/p Amio drip, switched to PO Amiodarone 200mg BID per Dr. Higuera (LFT trending down)  -S/p Digoxin load today: Dig level 1. Cont PO maintenance 0.25mg daily  -Monitor strict I/Os, daily weights  -Currently on 2L NC, Wean off O2 as tolerated C/o SOB for few days on admission w/ [facility]OPE[facility]. BNP 4033. Etiology likely Acute on chronic systolic CHF likely secondary to medication noncompliance, Afib w/ -120s.  -Trop mildly elevated 0.04 likely in setting of acute systolic CHF exac, have trended down.  -EP Dr. Higuera following for Dr Roberts. F/u recs  -Echo performed 7/12/17 w/ EF 15-20%, mod dilated LV, severe global hypokinesis, mild-mod MR, mild TR.  -CXR w/ pulm vasc congestion  -C/w Metoprolol 100mg BID  -C/w Lisinopril 10mg once a day  -S/lp Lasix 40mg IV BID yes (80mg PO at home)  -S/p Amio drip, switched to PO Amiodarone 200mg BID per Dr. Higuera (LFT trending down)  -S/p Digoxin load: Dig level 1. Cont PO maintenance 0.25mg daily  -Monitor strict I/Os, daily weights  -Lung exam improved today. Weaned off 2L O2 to RA today, maintaining SpO2 >95% C/o SOB for few days on admission w/ LOPE[facility]. BNP 4033. Etiology likely Acute on chronic systolic CHF likely secondary to medication noncompliance, Afib w/ -120s.  -Trop mildly elevated 0.04 likely in setting of acute systolic CHF exac, have trended down.  -EP Dr. Higuera following for Dr Roberts. F/u recs  -Echo performed 7/12/17 w/ EF 15-20%, mod dilated LV, severe global hypokinesis, mild-mod MR, mild TR.  -CXR w/ pulm vasc congestion  -C/w Metoprolol 100mg BID  -C/w Lisinopril 10mg once a day  -S/lp Lasix 40mg IV BID yesterday (40mg BID PO at home)  -S/p Amio drip, switched to PO Amiodarone 200mg BID per Dr. Higuera (LFT trending down)  -S/p Digoxin load: Dig level 1. Cont PO maintenance 0.25mg daily  -Monitor strict I/Os, daily weights  -Lung exam improved today. Weaned off 2L O2 to RA today, maintaining SpO2 >95% C/o SOB for few days on admission w/ LOPEZ. BNP 4033. Etiology likely Acute on chronic systolic CHF likely secondary to medication noncompliance, Afib w/ -120s.  -Trop mildly elevated 0.04 likely in setting of acute systolic CHF exac, have trended down.  -EP Dr. Higuera following for Dr Roberts. F/u recs  -Echo performed 7/12/17 w/ EF 15-20%, mod dilated LV, severe global hypokinesis, mild-mod MR, mild TR.  -CXR w/ pulm vasc congestion  -C/w Metoprolol 100mg BID  -C/w Lisinopril 10mg once a day  -S/lp Lasix 40mg IV BID yesterday. Switch to Lasix 40mg BID PO today (home dose).  -S/p Amio drip, switched to PO Amiodarone 200mg BID per Dr. Higuera (LFT trending down)  -S/p Digoxin load: Dig level 1. Cont PO maintenance Digoxin 0.25mg daily  -Monitor strict I/Os, daily weights  -Lung exam improved today. Weaned off 2L O2 to RA today, maintaining SpO2 >95%

## 2017-07-15 NOTE — PROGRESS NOTE ADULT - PROBLEM SELECTOR PLAN 4
Vinh on board  -No further periods of delirium noted  -patient concerned about housing, discussed with social work, will likely f/u on Monday  -patient wants to go to alcohol rehab Vinh following.  -No further periods of delirium noted  -patient concerned about housing, discussed with social work, will likely f/u on Monday  -patient wants to go to alcohol rehab

## 2017-07-15 NOTE — PROGRESS NOTE ADULT - PROBLEM SELECTOR PLAN 9
Discharge pending clinical progress    Social work consult Discharge pending clinical progress    PT consult: pt reports ambulates w/ cane  Social work consult

## 2017-07-15 NOTE — PROGRESS NOTE ADULT - SUBJECTIVE AND OBJECTIVE BOX
CARDIOLOGY NP PROGRESS NOTE    Subjective: Pt seen and examined at bedside. Reports sob feeling improved.    Overnight Events: None    TELEMETRY: Afib 90-100s        VITAL SIGNS:  T(C): 37.1 (07-15-17 @ 10:00), Max: 37.4 (07-14-17 @ 21:44)  HR: 102 (07-15-17 @ 10:57) (88 - 102)  BP: 100/55 (07-15-17 @ 10:57) (99/68 - 129/92)  RR: 20 (07-15-17 @ 10:57) (15 - 20)  SpO2: 96% (07-15-17 @ 10:57) (96% - 100%)  Wt(kg): --    I&O's Summary    14 Jul 2017 07:01  -  15 Jul 2017 07:00  --------------------------------------------------------  IN: 280 mL / OUT: 2100 mL / NET: -1820 mL    15 Jul 2017 07:01  -  15 Jul 2017 13:08  --------------------------------------------------------  IN: 100 mL / OUT: 0 mL / NET: 100 mL          PHYSICAL EXAM:    General: A/ox 3, No acute Distress  Neck: Supple, NO JVD  Cardiac: S1 S2, No M/R/G  Pulmonary: Mildly diminished breath sounds michael- improved from yesterday, Breathing unlabored, No Rhonchi/Rales/Wheezing  Abdomen: Soft, Non-tender, +BS x 4 quads  Extremities: No Rashes, No LE edema. Chronic R knee swelling/pain x yrs per pt  Neuro: A/o x 3, No focal deficits. No tremors/anxiety visible on exam          LABS:                          13.7   9.4   )-----------( 174      ( 15 Jul 2017 06:40 )             41.6                              07-15    139  |  100  |  31<H>  ----------------------------<  106<H>  4.1   |  20<L>  |  1.60<H>    Ca    9.1      15 Jul 2017 06:40  Phos  4.3     07-14  Mg     1.6     07-15    TPro  8.0  /  Alb  3.9  /  TBili  1.6<H>  /  DBili  x   /  AST  35  /  ALT  58<H>  /  AlkPhos  82  07-15    LIVER FUNCTIONS - ( 15 Jul 2017 06:40 )  Alb: 3.9 g/dL / Pro: 8.0 g/dL / ALK PHOS: 82 U/L / ALT: 58 U/L / AST: 35 U/L / GGT: x                                   CAPILLARY BLOOD GLUCOSE              No Known Allergies    MEDICATIONS  (STANDING):  rivaroxaban 20 milliGRAM(s) Oral daily  metoprolol 100 milliGRAM(s) Oral two times a day  amiodarone    Tablet 200 milliGRAM(s) Oral two times a day  digoxin     Tablet 0.125 milliGRAM(s) Oral daily  lisinopril 10 milliGRAM(s) Oral daily  LORazepam     Tablet 2 milliGRAM(s) Oral every 8 hours    MEDICATIONS  (PRN):        DIAGNOSTIC TESTS:     LAKEISHA 7/14/17: There is severe global hypokinesis of the left ventricle.The left ventricular ejection fraction is severely reduced.The left atrium is dilated.No clot seen in the left atrium or in the left atrial appendage.Left atrial appendage emptying velocities are reduced.Right atrial size is normal.The right ventricle is normal in size and function.Structurally normal aortic valve.There is mild to moderate aortic regurgitation.Structurally normal mitral valve.There is moderate mitral regurgitation.Structurally normal tricuspid valve.No aortic root   dilatation.Mild atherosclerotic plaque(s) in the aortic arch.Mild   atherosclerotic plaque(s) in the descending aorta.There is no pericardial   effusion.    Michael LE Doppler 7/14/17: No deep vein thrombosis seen.    Abd US 7/14/17: Hepatic steatosis. Partially contracted gallbladder.

## 2017-07-15 NOTE — PROGRESS NOTE ADULT - PROBLEM SELECTOR PLAN 2
Hx Afib ablation, managed by EP Dr. Kristofer Roberts, colleague Dr. Higuera is seeing patient   -S/p Amio drip, switched to PO Amiodarone 200mg BID per Dr. Higuera (LFT downtrending), cont to monitor LFTs  -Dig level 1.0 (7/14), c/w maintainence PO 0.25mg daily   -C/w Metoprolol 100mg BID  -Currently Afib rate controlled w/ HR in 70-90's today  -C/w Xarelto 20mg PO for anticoagulation, CrCl 66ml/min  -S/p LAKEISHA performed today w/ no LA clot. Plan for DCCV on Monday. Hx Afib ablation, managed by EP Dr. Kristofer Roberts, colleague Dr. Higuera is seeing patient   -S/p Amio drip, switched to PO Amiodarone 200mg BID per Dr. Higuera (LFT downtrending), cont to monitor LFTs  -Dig level 1.0 (7/14), c/w maintainence PO Digoxin 0.25mg daily   -C/w Metoprolol 100mg BID  -Currently Afib rate controlled w/ HR in 70-90's today  -C/w Xarelto 20mg PO for anticoagulation, CrCl 66ml/min  -S/p LAKEISHA performed today w/ no LA clot. Plan for DCCV on Monday. Hx Afib ablation, managed by EP Dr. Kristofer Roberts, colleague Dr. Higuera is seeing patient   -S/p Amio drip, continue Amiodarone 200mg BID PO inhospital, decrease to 200mg PO daily at discharge per Dr. Higuera (LFT downtrending), cont to monitor LFTs  -Dig level 1.0 (7/14), c/w maintenance PO Digoxin 0.125mg daily   -C/w Metoprolol 100mg BID PO  -Currently Afib rate controlled w/ HR in 70-90's  -C/w Xarelto 20mg PO for anticoagulation, CrCl 66ml/min  -S/p LAKEISHA performed w/ no LA clot. Plan for DCCV on Monday.  -Hx sleep apnea per pt, placed on CPAP at night.

## 2017-07-15 NOTE — PROGRESS NOTE ADULT - SUBJECTIVE AND OBJECTIVE BOX
61 year old M poor historian w/ PMH of CHF, HTN, atrial fibrillation s/p ablation, HLD, EtOH abuse presenting to [facility] ED w/ SOB. Patient states he ran out of his medications this past Sunday and started to feel short of breath last night. He has worsening dyspnea on exertion. He also has been drinking a pint of vodka for the last 4 years daily. His last drink was last night. He denies a history of alcohol withdrawal seizures and is unsure if he has ever been intubated. He currently denies any nausea or vomiting, has some anxiety, denies hallucinations. In the ED, T 99,  (sinus) --> 107, /145 --> 137/88, RR 22, Sat 100%. In the ED, he was given 4 mg zofran, lopressor 5 mg IVP, , Mag 2 gm, lasix 40 mg, ativan 2 mg x2, and 1L NS bolus.     MY ASSESSMENT TODAY:  Remains drowsy  Social concerns outlined  Patient now in AF with better rate control  tolerating Rivaroxaban    MEDICATIONS:  furosemide   Injectable 40 milliGRAM(s) IV Push two times a day  metoprolol 100 milliGRAM(s) Oral two times a day  amiodarone    Tablet 200 milliGRAM(s) Oral two times a day  digoxin     Tablet 0.125 milliGRAM(s) Oral daily  lisinopril 10 milliGRAM(s) Oral daily  LORazepam     Tablet 2 milliGRAM(s) Oral every 8 hours  rivaroxaban 20 milliGRAM(s) Oral daily  	    [PHYSICAL EXAM:  TELEMETRY:  T(C): 37.2 (07-15-17 @ 05:16), Max: 37.4 (07-14-17 @ 21:44)  HR: 92 (07-15-17 @ 05:04) (82 - 96)  BP: 124/88 (07-15-17 @ 05:04) (99/68 - 129/92)  RR: 15 (07-15-17 @ 05:04) (15 - 18)  SpO2: 97% (07-15-17 @ 05:04) (96% - 100%)  Wt(kg): --  I&O's Summary    14 Jul 2017 07:01  -  15 Jul 2017 07:00  --------------------------------------------------------  IN: 280 mL / OUT: 2100 mL / NET: -1820 mL        Thornton:  Central/PICC/Mid Line:                                         Appearance: Normal	  HEENT:   Normal oral mucosa, PERRL, EOMI	  Neck: Supple, + JVD/ - JVD; Carotid Bruit   Cardiovascular: Normal S1 S2, No JVD, No murmurs,   Respiratory: Lungs clear to auscultation/Decreased Breath Sounds/No Rales, Rhonchi, Wheezing	  Gastrointestinal:  Soft, Non-tender, + BS	  Skin: No rashes, No ecchymoses, No cyanosis  Extremities: Normal range of motion, No clubbing, cyanosis or edema  Vascular: Peripheral pulses palpable 2+ bilaterally  Neurologic: Non-focal  Psychiatry: A & O x 3, Mood & affect appropriate      	    ECG:    Tele: AF with controlled VR    LABS:	 	  CARDIAC MARKERS:                          13.7   9.4   )-----------( 174      ( 15 Jul 2017 06:40 )             41.6     07-15    139  |  100  |  31<H>  ----------------------------<  106<H>  4.1   |  20<L>  |  1.60<H>    Ca    9.1      15 Jul 2017 06:40  Phos  4.3     07-14  Mg     1.6     07-15    TPro  8.0  /  Alb  3.9  /  TBili  1.6<H>  /  DBili  x   /  AST  35  /  ALT  58<H>  /  AlkPhos  82  07-15      ASSESSMENT/PLAN: 	      61 year old M poor historian w/ PMH of CHF, HTN, atrial fibrillation s/p AF ablation with acute decompensated systolic CHF secondary to atrial flutter with rapid rates, non compliance and ? alcohol withdrawal    LAKEISHA shows no LA / GOLD thrombus - patient is now on AC - OK to continue Amiodarone / Cardiovert    LFTs trending down - likely secondary to alcohol use    RECOMMEND    - Continue AC and diuresis  - AAD therapy: Reduce Amiodarone to 200 mg PO QD on discharge  - AC: Reduced Xarelto dose based on eGFR  - CHF therapy  a. Uptitrate Lisinopril  b. Metoprolol 100 mg BID  c. Reduce Digoxin to 0.125 mg QD    Address substance abuse and social issues    Follow up with Dr. Roberts post discharge

## 2017-07-15 NOTE — PROGRESS NOTE ADULT - PROBLEM SELECTOR PLAN 3
Anxiety and symptoms may be from cardiac issues.   -Psych consulted, F/u recs.  -Continue to monitor CIWA. CIWA score 5 today.   -S/p Ativan 2mg IV x 2 yesterday.   -Placed on standing PO Ativan 2mg q8h per psych. Anxiety and symptoms may be from cardiac issues.   -Psych consulted, F/u recs.  -Continue to monitor CIWA. CIWA score 5 yesterday, improving to 1 today   -S/p Ativan 2mg IV x 2. Placed on standing PO Ativan 2mg q8h per psych yesterday.   -Will switch Ativan to PRN as pt appears sleepy without s/s withdrawal during the day

## 2017-07-16 DIAGNOSIS — M25.561 PAIN IN RIGHT KNEE: ICD-10-CM

## 2017-07-16 DIAGNOSIS — I48.91 UNSPECIFIED ATRIAL FIBRILLATION: ICD-10-CM

## 2017-07-16 DIAGNOSIS — R50.9 FEVER, UNSPECIFIED: ICD-10-CM

## 2017-07-16 DIAGNOSIS — I50.23 ACUTE ON CHRONIC SYSTOLIC (CONGESTIVE) HEART FAILURE: ICD-10-CM

## 2017-07-16 LAB
ALBUMIN SERPL ELPH-MCNC: 3.5 G/DL — SIGNIFICANT CHANGE UP (ref 3.3–5)
ALP SERPL-CCNC: 73 U/L — SIGNIFICANT CHANGE UP (ref 40–120)
ALT FLD-CCNC: 36 U/L — SIGNIFICANT CHANGE UP (ref 10–45)
ANION GAP SERPL CALC-SCNC: 19 MMOL/L — HIGH (ref 5–17)
APPEARANCE UR: CLEAR — SIGNIFICANT CHANGE UP
AST SERPL-CCNC: 18 U/L — SIGNIFICANT CHANGE UP (ref 10–40)
B PERT IGG+IGM PNL SER: SIGNIFICANT CHANGE UP
BASOPHILS NFR BLD AUTO: 0.1 % — SIGNIFICANT CHANGE UP (ref 0–2)
BILIRUB SERPL-MCNC: 1.5 MG/DL — HIGH (ref 0.2–1.2)
BILIRUB UR-MCNC: NEGATIVE — SIGNIFICANT CHANGE UP
BUN SERPL-MCNC: 41 MG/DL — HIGH (ref 7–23)
C DIFF GDH STL QL: NEGATIVE — SIGNIFICANT CHANGE UP
C DIFF GDH STL QL: SIGNIFICANT CHANGE UP
CALCIUM SERPL-MCNC: 9.3 MG/DL — SIGNIFICANT CHANGE UP (ref 8.4–10.5)
CHLORIDE SERPL-SCNC: 98 MMOL/L — SIGNIFICANT CHANGE UP (ref 96–108)
CO2 SERPL-SCNC: 21 MMOL/L — LOW (ref 22–31)
COLOR FLD: YELLOW — SIGNIFICANT CHANGE UP
COLOR SPEC: YELLOW — SIGNIFICANT CHANGE UP
CREAT SERPL-MCNC: 2.2 MG/DL — HIGH (ref 0.5–1.3)
CRP SERPL-MCNC: 27 MG/DL — HIGH (ref 0–0.4)
CRYSTALS SNV QL MICRO: SIGNIFICANT CHANGE UP
DIFF PNL FLD: (no result)
ERYTHROCYTE [SEDIMENTATION RATE] IN BLOOD: 75 MM/HR — HIGH
FLUID INTAKE SUBSTANCE CLASS: SIGNIFICANT CHANGE UP
FLUID SEGMENTED GRANULOCYTES: 95 % — SIGNIFICANT CHANGE UP
GAS PNL BLDA: SIGNIFICANT CHANGE UP
GLUCOSE SERPL-MCNC: 119 MG/DL — HIGH (ref 70–99)
GLUCOSE UR QL: NEGATIVE — SIGNIFICANT CHANGE UP
HCT VFR BLD CALC: 37.7 % — LOW (ref 39–50)
HGB BLD-MCNC: 12.2 G/DL — LOW (ref 13–17)
KETONES UR-MCNC: NEGATIVE — SIGNIFICANT CHANGE UP
LACTATE SERPL-SCNC: 1.1 MMOL/L — SIGNIFICANT CHANGE UP (ref 0.5–2)
LACTATE SERPL-SCNC: 1.8 MMOL/L — SIGNIFICANT CHANGE UP (ref 0.5–2)
LEUKOCYTE ESTERASE UR-ACNC: NEGATIVE — SIGNIFICANT CHANGE UP
LYMPHOCYTES # BLD AUTO: 8.6 % — LOW (ref 13–44)
LYMPHOCYTES # FLD: 1 % — SIGNIFICANT CHANGE UP
MAGNESIUM SERPL-MCNC: 1.8 MG/DL — SIGNIFICANT CHANGE UP (ref 1.6–2.6)
MCHC RBC-ENTMCNC: 30.7 PG — SIGNIFICANT CHANGE UP (ref 27–34)
MCHC RBC-ENTMCNC: 32.4 G/DL — SIGNIFICANT CHANGE UP (ref 32–36)
MCV RBC AUTO: 94.7 FL — SIGNIFICANT CHANGE UP (ref 80–100)
MONOCYTES NFR BLD AUTO: 13.9 % — SIGNIFICANT CHANGE UP (ref 2–14)
MONOS+MACROS # FLD: 4 % — SIGNIFICANT CHANGE UP
NEUTROPHILS NFR BLD AUTO: 77.4 % — HIGH (ref 43–77)
NITRITE UR-MCNC: NEGATIVE — SIGNIFICANT CHANGE UP
PH UR: 5 — SIGNIFICANT CHANGE UP (ref 5–8)
PLATELET # BLD AUTO: 184 K/UL — SIGNIFICANT CHANGE UP (ref 150–400)
POTASSIUM SERPL-MCNC: 3.9 MMOL/L — SIGNIFICANT CHANGE UP (ref 3.5–5.3)
POTASSIUM SERPL-SCNC: 3.9 MMOL/L — SIGNIFICANT CHANGE UP (ref 3.5–5.3)
PROT SERPL-MCNC: 7.6 G/DL — SIGNIFICANT CHANGE UP (ref 6–8.3)
PROT UR-MCNC: (no result) MG/DL
RAPID RVP RESULT: SIGNIFICANT CHANGE UP
RBC # BLD: 3.98 M/UL — LOW (ref 4.2–5.8)
RBC # FLD: 14.5 % — SIGNIFICANT CHANGE UP (ref 10.3–16.9)
RCV VOL RI: 41 /UL — HIGH (ref 0–5)
SODIUM SERPL-SCNC: 138 MMOL/L — SIGNIFICANT CHANGE UP (ref 135–145)
SP GR SPEC: 1.02 — SIGNIFICANT CHANGE UP (ref 1–1.03)
TOTAL NUCLEATED CELL COUNT, BODY FLUID: 9400 /UL — HIGH (ref 0–5)
TUBE TYPE: SIGNIFICANT CHANGE UP
UROBILINOGEN FLD QL: 1 E.U./DL — SIGNIFICANT CHANGE UP
WBC # BLD: 11 K/UL — HIGH (ref 3.8–10.5)
WBC # FLD AUTO: 11 K/UL — HIGH (ref 3.8–10.5)

## 2017-07-16 PROCEDURE — 93010 ELECTROCARDIOGRAM REPORT: CPT

## 2017-07-16 PROCEDURE — 73562 X-RAY EXAM OF KNEE 3: CPT | Mod: 26,50

## 2017-07-16 PROCEDURE — 71010: CPT | Mod: 26

## 2017-07-16 RX ORDER — RIVAROXABAN 15 MG-20MG
15 KIT ORAL DAILY
Qty: 0 | Refills: 0 | Status: DISCONTINUED | OUTPATIENT
Start: 2017-07-16 | End: 2017-07-20

## 2017-07-16 RX ORDER — COLCHICINE 0.6 MG
0.6 TABLET ORAL DAILY
Qty: 0 | Refills: 0 | Status: DISCONTINUED | OUTPATIENT
Start: 2017-07-16 | End: 2017-07-18

## 2017-07-16 RX ADMIN — Medication 40 MILLIGRAM(S): at 05:39

## 2017-07-16 RX ADMIN — AMIODARONE HYDROCHLORIDE 200 MILLIGRAM(S): 400 TABLET ORAL at 05:39

## 2017-07-16 RX ADMIN — AMIODARONE HYDROCHLORIDE 200 MILLIGRAM(S): 400 TABLET ORAL at 17:54

## 2017-07-16 RX ADMIN — Medication 0.6 MILLIGRAM(S): at 13:22

## 2017-07-16 RX ADMIN — LISINOPRIL 10 MILLIGRAM(S): 2.5 TABLET ORAL at 05:38

## 2017-07-16 RX ADMIN — Medication 0.12 MILLIGRAM(S): at 05:39

## 2017-07-16 RX ADMIN — Medication 100 MILLIGRAM(S): at 21:32

## 2017-07-16 RX ADMIN — Medication 100 MILLIGRAM(S): at 10:53

## 2017-07-16 RX ADMIN — RIVAROXABAN 15 MILLIGRAM(S): KIT at 15:42

## 2017-07-16 NOTE — PROVIDER CONTACT NOTE (OTHER) - BACKGROUND
CHF, HTN, atrial fibrillation s/p ablation, HLD, ETOH abuse presented to [facility] ED w/ SOB
CHF, HTN, atrial fibrillation s/p ablation, HLD, ETOH abuse presented to [facility] ED w/ SOB.
CHF, HTN, atrial fibrillation s/p ablation, HLD, ETOH abuse presented to [facility] ED w/ SOB.

## 2017-07-16 NOTE — PROGRESS NOTE ADULT - PROBLEM SELECTOR PLAN 6
SBP 99-140s  -C/w lisinopril, metoprolol Anion gap metabolic acidosis possibly secondary to EtOH ketoacidosis vs elevated lactate.  -lactate 1.1 (7/12)  -consider ABG

## 2017-07-16 NOTE — PROGRESS NOTE ADULT - ASSESSMENT
61 year old M w/ PMH of CHF, HTN, atrial fibrillation s/p ablation, HLD, EtOH abuse presented to [facility] ED w/ medication noncompliance x3 days and SOB x1 day most likely 2/2 to acute on chronic systolic CHF exacerbation in setting of A-fib with RVR and medication non-compliance. 61 year old M w/ PMH of CHF, HTN, atrial fibrillation s/p ablation, HLD, EtOH abuse presented to [facility] ED w/ medication noncompliance x1wk and SOB most likely 2/2 to acute on chronic systolic CHF exacerbation in setting of Afib with RVR and medication non-compliance.

## 2017-07-16 NOTE — PROGRESS NOTE ADULT - PROBLEM SELECTOR PLAN 2
Hx Afib ablation, managed by EP Dr. Kristofer Roberts, colleague Dr. Higuera is seeing patient   -S/p Amio drip, continue Amiodarone 200mg BID PO inhospital, decrease to 200mg PO daily at discharge per Dr. Higuera (LFT downtrending), cont to monitor LFTs  -Dig level 1.0 (7/14), c/w maintenance PO Digoxin 0.125mg daily   -C/w Metoprolol 100mg BID PO  -Currently Afib rate controlled w/ HR in 70-90's  -C/w Xarelto 20mg PO for anticoagulation, CrCl 66ml/min  -S/p LAKEISHA performed w/ no LA clot. Plan for DCCV on Monday.  -Hx sleep apnea per pt, placed on CPAP at night. Hx Afib ablation, managed by EP Dr. Kristofer Roberts, colleague Dr. Higuera is seeing patient   -S/p Amio drip, continue Amiodarone 200mg BID PO inhospital, decrease to 200mg PO daily at discharge per Dr. Higuera. LFTs elevated on admission, now improved. Cont to monitor LFTs  -Dig level 1.0 (7/14), c/w maintenance PO Digoxin 0.125mg daily   -C/w Metoprolol 100mg BID PO  -Currently Afib rate controlled w/ HR in 's  -C/w Xarelto 20mg PO for anticoagulation, CrCl 66ml/min  -S/p LAKEISHA performed w/ no LA clot. Plan for DCCV on Monday.  -Hx sleep apnea per pt, placed on CPAP at night. Hx Afib ablation, managed by EP Dr. Kristofer Roberts, colleague Dr. Higuera is seeing patient   -S/p Amio drip, continue Amiodarone 200mg BID PO inhospital, decrease to 200mg PO daily at discharge per Dr. Higuera. LFTs elevated on admission, now improved. Cont to monitor LFTs  -Dig level 1.0 (7/14), c/w maintenance PO Digoxin 0.125mg daily   -C/w Metoprolol 100mg BID PO  -Currently Afib rate controlled w/ HR in 's  -Decreased Xarelto 15mg PO for anticoagulation, CrCl 44  -S/p LAKEISHA performed w/ no LA clot. Plan for DCCV on Monday.  -Hx sleep apnea per pt, placed on CPAP at night.

## 2017-07-16 NOTE — PROGRESS NOTE ADULT - PROBLEM SELECTOR PLAN 1
C/o SOB for few days on admission w/ [facility]OPE[facility]. BNP 4033. Etiology likely Acute on chronic systolic CHF likely secondary to medication noncompliance, Afib w/ -120s.  -Trop mildly elevated 0.04 likely in setting of acute systolic CHF exac, have trended down.  -EP Dr. Higuera following for Dr Roberts. F/u recs  -Echo performed 7/12/17 w/ EF 15-20%, mod dilated LV, severe global hypokinesis, mild-mod MR, mild TR.  -CXR w/ pulm vasc congestion  -C/w Metoprolol 100mg BID  -C/w Lisinopril 10mg once a day  -S/lp Lasix 40mg IV BID yesterday. Switch to Lasix 40mg BID PO today (home dose).  -S/p Amio drip, switched to PO Amiodarone 200mg BID per Dr. Higuera (LFT trending down)  -S/p Digoxin load: Dig level 1. Cont PO maintenance Digoxin 0.25mg daily  -Monitor strict I/Os, daily weights  -Lung exam improved today. Weaned off 2L O2 to RA today, maintaining SpO2 >95% C/o SOB for few days on admission w/ [facility]. BNP 4033. Etiology likely Acute on chronic systolic CHF likely secondary to medication noncompliance, Afib w/ -120s.  -Trop mildly elevated 0.04 likely in setting of acute systolic CHF exac, have trended down.  -EP Dr. Higuera following for Dr Roberts. F/u recs  -Echo performed 7/12/17 w/ EF 15-20%, mod dilated LV, severe global hypokinesis, mild-mod MR, mild TR.  -CXR w/ pulm vasc congestion on admission, repeat CXR improved.  -C/w Metoprolol 100mg BID PO  -C/w Lisinopril 10mg PO qd  -S/p IV diuresis w/ Lasix 40mg BID. Switch to Lasix 40mg PO BID yesterday.   - Will hold further Lasix today 2/2 elevated BUN/Crea possibly from overdiuresis  -S/p Digoxin load: Dig level 1. Cont PO maintenance Digoxin 0.25mg daily  -Monitor strict I/Os, daily weights  -Lung exam improved. Weaned off 2L O2 to RA, maintaining SpO2 >95% C/o SOB for few days on admission w/ [facility]. BNP 4033. Etiology likely Acute on chronic systolic CHF likely secondary to medication noncompliance, Afib w/ -120s.  -Trop mildly elevated 0.04 likely in setting of acute systolic CHF exac, have trended down.  -EP Dr. Higuera following for Dr Roberts. F/u recs  -Echo performed 7/12/17 w/ EF 15-20%, mod dilated LV, severe global hypokinesis, mild-mod MR, mild TR.  -CXR w/ pulm vasc congestion on admission, repeat CXR improved.  -C/w Metoprolol 100mg BID PO  -C/w Lisinopril 10mg PO qd  -S/p IV diuresis w/ Lasix 40mg BID. Switch to Lasix 40mg PO BID yesterday.   - Will hold further Lasix today 2/2 elevated BUN/Crea possibly from overdiuresis  -S/p Digoxin load: Dig level 1. Cont PO maintenance Digoxin 0.25mg daily  -Monitor strict I/Os, daily weights  -Lung exam improved. Weaned off 2L O2 to RA, maintaining SpO2 >95%. CPAP when sleeping.

## 2017-07-16 NOTE — PROVIDER CONTACT NOTE (OTHER) - ASSESSMENT
pt confused, RN reorient as needed, refusing CPAP mask, t=100.1, dr. waters/ dr flores notified and at bedside requesting FS, fw=103
temporal temp 100.1, NP Amaury notified advised rectal temp
Dr. Sanchez and Rn at bedside evaluate pt, RN noted Hematuria via [place] catheter, no pain/distention noted, pt was incontinent of urine x2 at start of shift at which time RN change soiled linen and applied [place] catheter

## 2017-07-16 NOTE — PROGRESS NOTE ADULT - PROBLEM SELECTOR PLAN 4
Vinh following.  -No further periods of delirium noted  -patient concerned about housing, discussed with social work, will likely f/u on Monday  -patient wants to go to alcohol rehab Anxiety and symptoms may be from cardiac issues vs possible ETOH withdrawal  -Psych consulted, F/u recs.  -Continue to monitor CIWA. CIWA 1-2 today   -S/p Ativan 2mg IV x 2. Standing PO Ativan 2mg q8h d/c'ed yesterday, now on q8h PRN as pt appears sleepy without s/s withdrawal Pt w/ rectal temp 101.1 today. Pancultured. Unclear source possible 2/2 inflammatory process of acute gout flareup  - Pt w/ rectal temp 101.1 today. Pancultured. Unclear source possible 2/2 inflammatory process of acute gout flare up   -CXR, RVP, UA neg.  -Lactate WNL  -R knee fluid aspiration neg for septic joint. Uric crystals noted c/w acute gout flare up  -F/u BC x2, UCx results  -Monitor L hand/forearm redness/mild pain where previous IV was removed for phlebitis vs cellulitis Pt w/ rectal temp 101.1 today. Pancultured. Unclear source possible 2/2 inflammatory process of acute gout flare up   -CXR, RVP, UA neg.  -Lactate WNL  -R knee fluid aspiration neg for septic joint. Uric crystals noted c/w acute gout flare up  -F/u BC x2, UCx results  -Monitor L hand/forearm redness/mild pain where previous IV was removed for phlebitis vs cellulitis  -No Abx at this time as unclear source of fever, possibly 2/2 inflammatory process of acute gout flareup

## 2017-07-16 NOTE — PROGRESS NOTE ADULT - PROBLEM SELECTOR PLAN 5
Anion gap metabolic acidosis possibly secondary to EtOH ketoacidosis vs elevated lactate.  -lactate 1.1 (7/12)  -consider ABG Anxiety and symptoms may be from cardiac issues vs possible ETOH withdrawal  -Psych consulted, F/u recs.  -Continue to monitor CIWA. CIWA 1-2 today   -S/p Ativan 2mg IV x 2. Standing PO Ativan 2mg q8h d/c'ed yesterday, now on q8h PRN as pt appears sleepy without s/s withdrawal

## 2017-07-16 NOTE — CONSULT NOTE ADULT - SUBJECTIVE AND OBJECTIVE BOX
Orthopaedic Surgery Consult Note    For Surgeon: Josefina    CC:   R Knee swelling, with increased ESR and CRP and WBC.    HPI:  61 year old M w/ PMH of CHF, HTN, atrial fibrillation s/p ablation, HLD, EtOH abuse presented to [facility] ED w/ SOB. Patient states that he ran out of his medications this past Sunday and started to feel short of breath last night. He complains of worsening dyspnea on exertion, stating that he used to be able to walk 20-30 yards before becoming SOB and not tires out much sooner. He had a similar episode of SOB in February for which he was seen at [facility] and diagnosed with a heart failure exacerbation. He also has been drinking a pint of vodka for the last 4 years daily. His last drink was 8PM Monday evening. He denies a history of alcohol withdrawal seizures and is unsure if he has ever been intubated. He currently denies any CP, nausea, vomiting, abd pain, headache, or hallucinations, but states that he feels very anxious.     In the ED, Tmax 99,  - 143 (sinus) 107, /77 - 189/145, RR 18 - 22, Sat 97% - 100%. In the ED, he was given 4 mg Zofran, Lopressor 5 mg IVP, , Mag 2 gm, Lasix 40 mg, Aivan 2 mg x3, and 1L NS banana bag x2, Librium 50mg and Valium 10mg. (12 Jul 2017 02:11)      Allergies    No Known Allergies    Intolerances      PAST MEDICAL & SURGICAL HISTORY:  HLD (hyperlipidemia)  Atrial fibrillation, unspecified type  Congestive heart failure: CHF (congestive heart failure)  Essential hypertension: HTN (hypertension)  No significant past surgical history    MEDICATIONS  (STANDING):  metoprolol 100 milliGRAM(s) Oral two times a day  amiodarone    Tablet 200 milliGRAM(s) Oral two times a day  digoxin     Tablet 0.125 milliGRAM(s) Oral daily  lisinopril 10 milliGRAM(s) Oral daily  colchicine 0.6 milliGRAM(s) Oral daily    MEDICATIONS  (PRN):  LORazepam     Tablet 2 milliGRAM(s) Oral every 8 hours PRN Agitation      Vital Signs Last 24 Hrs  T(C): 36.7 (16 Jul 2017 11:26), Max: 37.8 (16 Jul 2017 05:51)  T(F): 98.1 (16 Jul 2017 11:26), Max: 100.1 (16 Jul 2017 05:51)  HR: 86 (16 Jul 2017 11:44) (86 - 102)  BP: 103/63 (16 Jul 2017 10:59) (102/54 - 149/88)  BP(mean): 78 (16 Jul 2017 10:59) (78 - 116)  RR: 20 (16 Jul 2017 10:59) (16 - 20)  SpO2: 100% (16 Jul 2017 11:44) (92% - 100%)    Physical Exam:                          12.2   11.0  )-----------( 184      ( 16 Jul 2017 06:40 )             37.7     07-16    138  |  98  |  41<H>  ----------------------------<  119<H>  3.9   |  21<L>  |  2.20<H>    Ca    9.3      16 Jul 2017 06:40  Mg     1.8     07-16    TPro  7.6  /  Alb  3.5  /  TBili  1.5<H>  /  DBili  x   /  AST  18  /  ALT  36  /  AlkPhos  73  07-16      Fluid analysis:    Uric Acid crystals.    Nucleated cells: 9400  Fluid segmented granulocytes 95%    Imaging: X-Ray showing effusion and OA    A/P: 61yMale with acute gout attack R knee.  Please continue with medical management of Gout.    Call with any questions.    -Discussed with Dr. Rocha

## 2017-07-16 NOTE — PROGRESS NOTE ADULT - SUBJECTIVE AND OBJECTIVE BOX
61 year old M poor historian w/ PMH of CHF, HTN, atrial fibrillation s/p ablation, HLD, EtOH abuse presenting to [facility] ED w/ SOB. Patient states he ran out of his medications this past Sunday and started to feel short of breath last night. He has worsening dyspnea on exertion. He also has been drinking a pint of vodka for the last 4 years daily. His last drink was last night. He denies a history of alcohol withdrawal seizures and is unsure if he has ever been intubated. He currently denies any nausea or vomiting, has some anxiety, denies hallucinations. In the ED, T 99,  (sinus) --> 107, /145 --> 137/88, RR 22, Sat 100%. In the ED, he was given 4 mg zofran, lopressor 5 mg IVP, , Mag 2 gm, lasix 40 mg, ativan 2 mg x2, and 1L NS bolus.     MY ASSESSMENT TODAY:  Improved rate control - less SOB  Tolerating AAD therapy - on AC  LAKEISHA - no LA/GOLD thrombus    MEDICATIONS:  metoprolol 100 milliGRAM(s) Oral two times a day  amiodarone    Tablet 200 milliGRAM(s) Oral two times a day  digoxin     Tablet 0.125 milliGRAM(s) Oral daily  lisinopril 10 milliGRAM(s) Oral daily  LORazepam     Tablet 2 milliGRAM(s) Oral every 8 hours PRN  rivaroxaban 20 milliGRAM(s) Oral daily      [PHYSICAL EXAM:  TELEMETRY:  T(C): 36.7 (07-16-17 @ 11:26), Max: 37.8 (07-16-17 @ 05:51)  HR: 92 (07-16-17 @ 10:59) (86 - 102)  BP: 103/63 (07-16-17 @ 10:59) (102/54 - 149/88)  RR: 20 (07-16-17 @ 10:59) (16 - 20)  SpO2: 96% (07-16-17 @ 10:59) (92% - 100%)  Wt(kg): --  I&O's Summary    15 Jul 2017 07:01  -  16 Jul 2017 07:00  --------------------------------------------------------  IN: 400 mL / OUT: 550 mL / NET: -150 mL    16 Jul 2017 07:01  -  16 Jul 2017 11:40  --------------------------------------------------------  IN: 40 mL / OUT: 120 mL / NET: -80 mL                                           Appearance: Normal	  HEENT:   Normal oral mucosa, PERRL, EOMI	  Neck: Supple, + JVD/ - JVD; Carotid Bruit   Cardiovascular: Normal S1 S2, No JVD, No murmurs,   Respiratory: Lungs clear to auscultation/Decreased Breath Sounds/No Rales, Rhonchi, Wheezing	  Gastrointestinal:  Soft, Non-tender, + BS	  Skin: No rashes, No ecchymoses, No cyanosis  Extremities: Normal range of motion, No clubbing, cyanosis or edema  Vascular: Peripheral pulses palpable 2+ bilaterally  Neurologic: Non-focal  Psychiatry: A & O x 3, Mood & affect appropriate      LABS:	 	    EKG: Tele: AF with controlled VR                          12.2   11.0  )-----------( 184      ( 16 Jul 2017 06:40 )             37.7     07-16    138  |  98  |  41<H>  ----------------------------<  119<H>  3.9   |  21<L>  |  2.20<H>    Ca    9.3      16 Jul 2017 06:40  Mg     1.8     07-16    TPro  7.6  /  Alb  3.5  /  TBili  1.5<H>  /  DBili  x   /  AST  18  /  ALT  36  /  AlkPhos  73  07-16      ASSESSMENT/PLAN: 	    61 year old M poor historian w/ PMH of CHF, HTN, atrial fibrillation s/p AF ablation with acute decompensated systolic CHF secondary to atrial flutter with rapid rates, non compliance and ? alcohol withdrawal    Rate control has improved    LFTs trending down    RECOMMEND    - Continue diuresis  - AAD therapy: Reduce Amiodarone to 200 mg PO QD on discharge  - AC: Reduced Xarelto dose based on eGFR  - CHF therapy  a. Continue Lisinopril  b. Metoprolol 100 mg BID  c. Digoxin to 0.125 mg QD    Address substance abuse and social issues    Follow up with Dr. Roberts post discharge - plan elective ablation with his input

## 2017-07-16 NOTE — PROGRESS NOTE ADULT - PROBLEM SELECTOR PLAN 9
Discharge pending clinical progress    PT consult: pt reports ambulates w/ cane  Social work consult Discharge pending clinical progress    PT consult: pt reports ambulates w/ cane  Social work consult: Alcohol rehab, concerns about losing his residence 2/2 missing rent Dash/TLC Diet, replete lytes PRN, keep K >4, Mg >2    FULL CODE

## 2017-07-16 NOTE — PROGRESS NOTE ADULT - PROBLEM SELECTOR PLAN 3
Anxiety and symptoms may be from cardiac issues.   -Psych consulted, F/u recs.  -Continue to monitor CIWA. CIWA score 5 yesterday, improving to 1 today   -S/p Ativan 2mg IV x 2. Placed on standing PO Ativan 2mg q8h per psych yesterday.   -Will switch Ativan to PRN as pt appears sleepy without s/s withdrawal during the day C/o chronic R knee pain x20yrs 2/2 trauma. Reports pain increased over past few weeks, ambulates with cane. On exam R knee swelling w/ increased warmth. Elevated ESR, CRP, WBC.  -Concern for possible septic joint.  -Ortho consulted. F/u recs. C/o chronic R knee pain x20yrs 2/2 trauma. Reports pain increased over past few weeks, ambulates with cane. On exam R knee swelling w/ increased warmth. Elevated WBC, sed rate, CRP. Fever 101.1. Concern for possible septic joint.   -Ortho consulted. F/u recs.  -R knee Xray showed effusion and OA  -S/p R knee fluid aspiration 7/16 w/ Nucleated cell count 9400 and Uric acid crystals c/w acute R knee gout attack.   -Start Colchicine 0.6mg qd, +/-NSAID

## 2017-07-16 NOTE — PROGRESS NOTE ADULT - SUBJECTIVE AND OBJECTIVE BOX
CARDIOLOGY NP PROGRESS NOTE    Subjective: Pt seen and examined at bedside. Reports fe    Overnight Events:     TELEMETRY:    EKG:      VITAL SIGNS:  T(C): 37.8 (07-16-17 @ 05:51), Max: 37.8 (07-16-17 @ 05:51)  HR: 96 (07-16-17 @ 08:30) (86 - 102)  BP: 103/69 (07-16-17 @ 08:30) (102/54 - 149/88)  RR: 16 (07-16-17 @ 08:30) (16 - 20)  SpO2: 92% (07-16-17 @ 08:30) (92% - 100%)  Wt(kg): --    I&O's Summary    15 Jul 2017 07:01  -  16 Jul 2017 07:00  --------------------------------------------------------  IN: 400 mL / OUT: 550 mL / NET: -150 mL          PHYSICAL EXAM:    General: A/ox 3, No acute Distress  Neck: Supple, NO JVD  Cardiac: S1 S2, No M/R/G  Pulmonary: CTAB, Breathing unlabored, No Rhonchi/Rales/Wheezing  Abdomen: Soft, Non -tender, +BS x 4 quads  Extremities: No Rashes, No edema  Neuro: A/o x 3, No focal deficits          LABS:                          12.2   11.0  )-----------( 184      ( 16 Jul 2017 06:40 )             37.7                              07-16    138  |  98  |  41<H>  ----------------------------<  119<H>  3.9   |  21<L>  |  2.20<H>    Ca    9.3      16 Jul 2017 06:40  Mg     1.8     07-16    TPro  7.6  /  Alb  3.5  /  TBili  1.5<H>  /  DBili  x   /  AST  18  /  ALT  36  /  AlkPhos  73  07-16    LIVER FUNCTIONS - ( 16 Jul 2017 06:40 )  Alb: 3.5 g/dL / Pro: 7.6 g/dL / ALK PHOS: 73 U/L / ALT: 36 U/L / AST: 18 U/L / GGT: x                                   CAPILLARY BLOOD GLUCOSE  103 (16 Jul 2017 05:51)                  No Known Allergies    MEDICATIONS  (STANDING):  rivaroxaban 20 milliGRAM(s) Oral daily  metoprolol 100 milliGRAM(s) Oral two times a day  amiodarone    Tablet 200 milliGRAM(s) Oral two times a day  digoxin     Tablet 0.125 milliGRAM(s) Oral daily  lisinopril 10 milliGRAM(s) Oral daily  furosemide    Tablet 40 milliGRAM(s) Oral every 12 hours    MEDICATIONS  (PRN):  LORazepam     Tablet 2 milliGRAM(s) Oral every 8 hours PRN Agitation        DIAGNOSTIC TESTS: CARDIOLOGY NP PROGRESS NOTE    Subjective: Pt seen and examined at bedside. Sleepy on exam but arousable and conversational. Reports R knee pain. Reports sob improved. Denies chest pain.    Overnight Events:     TELEMETRY:    EKG:      VITAL SIGNS:  T(C): 37.8 (07-16-17 @ 05:51), Max: 37.8 (07-16-17 @ 05:51)  HR: 96 (07-16-17 @ 08:30) (86 - 102)  BP: 103/69 (07-16-17 @ 08:30) (102/54 - 149/88)  RR: 16 (07-16-17 @ 08:30) (16 - 20)  SpO2: 92% (07-16-17 @ 08:30) (92% - 100%)  Wt(kg): --    I&O's Summary    15 Jul 2017 07:01  -  16 Jul 2017 07:00  --------------------------------------------------------  IN: 400 mL / OUT: 550 mL / NET: -150 mL          PHYSICAL EXAM:    General: A/ox 3, No acute Distress  Neck: Supple, NO JVD  Cardiac: S1 S2, No M/R/G  Pulmonary: Lungs CTA bilaterally, Breathing unlabored on RA, No Rhonchi/Rales/Wheezing  Abdomen: Soft, Non -tender, +BS x 4 quads  Extremities: No Rashes, No LE edema  Neuro: A/o x 3, No focal deficits          LABS:                          12.2   11.0  )-----------( 184      ( 16 Jul 2017 06:40 )             37.7                              07-16    138  |  98  |  41<H>  ----------------------------<  119<H>  3.9   |  21<L>  |  2.20<H>    Ca    9.3      16 Jul 2017 06:40  Mg     1.8     07-16    TPro  7.6  /  Alb  3.5  /  TBili  1.5<H>  /  DBili  x   /  AST  18  /  ALT  36  /  AlkPhos  73  07-16    LIVER FUNCTIONS - ( 16 Jul 2017 06:40 )  Alb: 3.5 g/dL / Pro: 7.6 g/dL / ALK PHOS: 73 U/L / ALT: 36 U/L / AST: 18 U/L / GGT: x                                   CAPILLARY BLOOD GLUCOSE  103 (16 Jul 2017 05:51)                  No Known Allergies    MEDICATIONS  (STANDING):  rivaroxaban 20 milliGRAM(s) Oral daily  metoprolol 100 milliGRAM(s) Oral two times a day  amiodarone    Tablet 200 milliGRAM(s) Oral two times a day  digoxin     Tablet 0.125 milliGRAM(s) Oral daily  lisinopril 10 milliGRAM(s) Oral daily  furosemide    Tablet 40 milliGRAM(s) Oral every 12 hours    MEDICATIONS  (PRN):  LORazepam     Tablet 2 milliGRAM(s) Oral every 8 hours PRN Agitation        DIAGNOSTIC TESTS: CARDIOLOGY NP PROGRESS NOTE    Subjective: Pt seen and examined at bedside. Sleepy on exam but arousable and conversational. Reports R knee pain. Reports sob improved. Denies chest pain.    Overnight Events: Periods of confusion overnight, CIWA 1-2, . Rectal temp 101.1 this morning    TELEMETRY: Afib 90-100s    EKG:       VITAL SIGNS:  T(C): 37.8 (07-16-17 @ 05:51), Max: 37.8 (07-16-17 @ 05:51)  HR: 96 (07-16-17 @ 08:30) (86 - 102)  BP: 103/69 (07-16-17 @ 08:30) (102/54 - 149/88)  RR: 16 (07-16-17 @ 08:30) (16 - 20)  SpO2: 92% (07-16-17 @ 08:30) (92% - 100%)  Wt(kg): --    I&O's Summary    15 Jul 2017 07:01  -  16 Jul 2017 07:00  --------------------------------------------------------  IN: 400 mL / OUT: 550 mL / NET: -150 mL          PHYSICAL EXAM:    General: A/ox 3, No acute Distress  Neck: Supple, NO JVD  Cardiac: S1 S2, No M/R/G  Pulmonary: Lungs CTA bilaterally, Breathing unlabored on RA, No Rhonchi/Rales/Wheezing  Abdomen: Soft, Non-tender, +BS x 4 quads  Extremities: No Rashes, No LE edema. Chronic R knee swelling/pain x yrs per pt -increased warmth/swelling/pain w/ movement noted today.  Neuro: A/o x 3, No focal deficits, somnolent, no visible tremors/anxiety visible           LABS:                          12.2   11.0  )-----------( 184      ( 16 Jul 2017 06:40 )             37.7                              07-16    138  |  98  |  41<H>  ----------------------------<  119<H>  3.9   |  21<L>  |  2.20<H>    Ca    9.3      16 Jul 2017 06:40  Mg     1.8     07-16    TPro  7.6  /  Alb  3.5  /  TBili  1.5<H>  /  DBili  x   /  AST  18  /  ALT  36  /  AlkPhos  73  07-16    LIVER FUNCTIONS - ( 16 Jul 2017 06:40 )  Alb: 3.5 g/dL / Pro: 7.6 g/dL / ALK PHOS: 73 U/L / ALT: 36 U/L / AST: 18 U/L / GGT: x                                   CAPILLARY BLOOD GLUCOSE  103 (16 Jul 2017 05:51)            No Known Allergies    MEDICATIONS  (STANDING):  rivaroxaban 20 milliGRAM(s) Oral daily  metoprolol 100 milliGRAM(s) Oral two times a day  amiodarone    Tablet 200 milliGRAM(s) Oral two times a day  digoxin     Tablet 0.125 milliGRAM(s) Oral daily  lisinopril 10 milliGRAM(s) Oral daily  furosemide    Tablet 40 milliGRAM(s) Oral every 12 hours    MEDICATIONS  (PRN):  LORazepam     Tablet 2 milliGRAM(s) Oral every 8 hours PRN Agitation        DIAGNOSTIC TESTS:     R Knee Xray 7/16/17: Cortical irregularity noted along the lateral aspect of the proximal tibia and fibular head, likely fracture deformities, age indeterminate. Right-sided joint effusion.    LAKEISHA 7/14/17: There is severe global hypokinesis of the left ventricle.The left ventricular ejection fraction is severely reduced.The left atrium is dilated.No clot seen in the left atrium or in the left atrial appendage.Left atrial appendage emptying velocities are reduced.Right atrial size is normal.The right ventricle is normal in size and function.Structurally normal aortic valve.There is mild to moderate aortic regurgitation.Structurally normal mitral valve.There is moderate mitral regurgitation.Structurally normal tricuspid valve.No aortic root  dilatation. Mild atherosclerotic plaque(s) in the aortic arch.Mild atherosclerotic plaque(s) in the descending aorta.There is no pericardial effusion.    Michael LE Doppler 7/14/17: No deep vein thrombosis seen.    Abd US 7/14/17: Hepatic steatosis. Partially contracted gallbladder. CARDIOLOGY NP PROGRESS NOTE    Subjective: Pt seen and examined at bedside. Sleepy on exam but arousable and conversational. Reports R knee pain. Reports sob improved. Denies chest pain.    Overnight Events: Periods of confusion overnight, CIWA 1-2, . Rectal temp 101.1 this morning    TELEMETRY: Afib 90-100s    EKG: Afib 89, QTC 498ms      VITAL SIGNS:  T(C): 37.8 (07-16-17 @ 05:51), Max: 37.8 (07-16-17 @ 05:51)  HR: 96 (07-16-17 @ 08:30) (86 - 102)  BP: 103/69 (07-16-17 @ 08:30) (102/54 - 149/88)  RR: 16 (07-16-17 @ 08:30) (16 - 20)  SpO2: 92% (07-16-17 @ 08:30) (92% - 100%)  Wt(kg): --    I&O's Summary    15 Jul 2017 07:01  -  16 Jul 2017 07:00  --------------------------------------------------------  IN: 400 mL / OUT: 550 mL / NET: -150 mL          PHYSICAL EXAM:    General: A/ox 3, No acute Distress  Neck: Supple, NO JVD  Cardiac: S1 S2, No M/R/G  Pulmonary: Lungs CTA bilaterally, Breathing unlabored on RA, No Rhonchi/Rales/Wheezing  Abdomen: Soft, Non-tender, +BS x 4 quads  Extremities: No Rashes, No LE edema. Chronic R knee swelling/pain x yrs per pt -increased warmth/swelling/pain w/ movement noted today.  Neuro: A/o x 3, No focal deficits, somnolent, no visible tremors/anxiety visible           LABS:                          12.2   11.0  )-----------( 184      ( 16 Jul 2017 06:40 )             37.7                              07-16    138  |  98  |  41<H>  ----------------------------<  119<H>  3.9   |  21<L>  |  2.20<H>    Ca    9.3      16 Jul 2017 06:40  Mg     1.8     07-16    TPro  7.6  /  Alb  3.5  /  TBili  1.5<H>  /  DBili  x   /  AST  18  /  ALT  36  /  AlkPhos  73  07-16    LIVER FUNCTIONS - ( 16 Jul 2017 06:40 )  Alb: 3.5 g/dL / Pro: 7.6 g/dL / ALK PHOS: 73 U/L / ALT: 36 U/L / AST: 18 U/L / GGT: x                                   CAPILLARY BLOOD GLUCOSE  103 (16 Jul 2017 05:51)            No Known Allergies    MEDICATIONS  (STANDING):  rivaroxaban 20 milliGRAM(s) Oral daily  metoprolol 100 milliGRAM(s) Oral two times a day  amiodarone    Tablet 200 milliGRAM(s) Oral two times a day  digoxin     Tablet 0.125 milliGRAM(s) Oral daily  lisinopril 10 milliGRAM(s) Oral daily  furosemide    Tablet 40 milliGRAM(s) Oral every 12 hours    MEDICATIONS  (PRN):  LORazepam     Tablet 2 milliGRAM(s) Oral every 8 hours PRN Agitation        DIAGNOSTIC TESTS:     R Knee Xray 7/16/17: Cortical irregularity noted along the lateral aspect of the proximal tibia and fibular head, likely fracture deformities, age indeterminate. Right-sided joint effusion.    LAKEISHA 7/14/17: There is severe global hypokinesis of the left ventricle.The left ventricular ejection fraction is severely reduced.The left atrium is dilated.No clot seen in the left atrium or in the left atrial appendage.Left atrial appendage emptying velocities are reduced.Right atrial size is normal.The right ventricle is normal in size and function.Structurally normal aortic valve.There is mild to moderate aortic regurgitation.Structurally normal mitral valve.There is moderate mitral regurgitation.Structurally normal tricuspid valve.No aortic root  dilatation. Mild atherosclerotic plaque(s) in the aortic arch.Mild atherosclerotic plaque(s) in the descending aorta.There is no pericardial effusion.    Michael LE Doppler 7/14/17: No deep vein thrombosis seen.    Abd US 7/14/17: Hepatic steatosis. Partially contracted gallbladder.

## 2017-07-17 LAB
ALBUMIN SERPL ELPH-MCNC: 3.3 G/DL — SIGNIFICANT CHANGE UP (ref 3.3–5)
ALP SERPL-CCNC: 70 U/L — SIGNIFICANT CHANGE UP (ref 40–120)
ALT FLD-CCNC: 26 U/L — SIGNIFICANT CHANGE UP (ref 10–45)
ANION GAP SERPL CALC-SCNC: 16 MMOL/L — SIGNIFICANT CHANGE UP (ref 5–17)
AST SERPL-CCNC: 14 U/L — SIGNIFICANT CHANGE UP (ref 10–40)
BASOPHILS NFR BLD AUTO: 0.1 % — SIGNIFICANT CHANGE UP (ref 0–2)
BILIRUB SERPL-MCNC: 1 MG/DL — SIGNIFICANT CHANGE UP (ref 0.2–1.2)
BUN SERPL-MCNC: 54 MG/DL — HIGH (ref 7–23)
CALCIUM SERPL-MCNC: 9.5 MG/DL — SIGNIFICANT CHANGE UP (ref 8.4–10.5)
CHLORIDE SERPL-SCNC: 100 MMOL/L — SIGNIFICANT CHANGE UP (ref 96–108)
CO2 SERPL-SCNC: 24 MMOL/L — SIGNIFICANT CHANGE UP (ref 22–31)
CREAT SERPL-MCNC: 2.2 MG/DL — HIGH (ref 0.5–1.3)
CULTURE RESULTS: NO GROWTH — SIGNIFICANT CHANGE UP
EOSINOPHIL NFR BLD AUTO: 0.2 % — SIGNIFICANT CHANGE UP (ref 0–6)
GLUCOSE SERPL-MCNC: 106 MG/DL — HIGH (ref 70–99)
GRAM STN FLD: SIGNIFICANT CHANGE UP
HCT VFR BLD CALC: 34.3 % — LOW (ref 39–50)
HGB BLD-MCNC: 11.2 G/DL — LOW (ref 13–17)
LYMPHOCYTES # BLD AUTO: 15.3 % — SIGNIFICANT CHANGE UP (ref 13–44)
MAGNESIUM SERPL-MCNC: 2.1 MG/DL — SIGNIFICANT CHANGE UP (ref 1.6–2.6)
MCHC RBC-ENTMCNC: 31.2 PG — SIGNIFICANT CHANGE UP (ref 27–34)
MCHC RBC-ENTMCNC: 32.7 G/DL — SIGNIFICANT CHANGE UP (ref 32–36)
MCV RBC AUTO: 95.5 FL — SIGNIFICANT CHANGE UP (ref 80–100)
MONOCYTES NFR BLD AUTO: 16.6 % — HIGH (ref 2–14)
NEUTROPHILS NFR BLD AUTO: 67.8 % — SIGNIFICANT CHANGE UP (ref 43–77)
PLATELET # BLD AUTO: 205 K/UL — SIGNIFICANT CHANGE UP (ref 150–400)
POTASSIUM SERPL-MCNC: 3.7 MMOL/L — SIGNIFICANT CHANGE UP (ref 3.5–5.3)
POTASSIUM SERPL-SCNC: 3.7 MMOL/L — SIGNIFICANT CHANGE UP (ref 3.5–5.3)
PROT SERPL-MCNC: 7.5 G/DL — SIGNIFICANT CHANGE UP (ref 6–8.3)
RBC # BLD: 3.59 M/UL — LOW (ref 4.2–5.8)
RBC # FLD: 14.6 % — SIGNIFICANT CHANGE UP (ref 10.3–16.9)
SODIUM SERPL-SCNC: 140 MMOL/L — SIGNIFICANT CHANGE UP (ref 135–145)
SPECIMEN SOURCE: SIGNIFICANT CHANGE UP
SPECIMEN SOURCE: SIGNIFICANT CHANGE UP
WBC # BLD: 8.4 K/UL — SIGNIFICANT CHANGE UP (ref 3.8–10.5)
WBC # FLD AUTO: 8.4 K/UL — SIGNIFICANT CHANGE UP (ref 3.8–10.5)

## 2017-07-17 PROCEDURE — 99233 SBSQ HOSP IP/OBS HIGH 50: CPT

## 2017-07-17 RX ORDER — POTASSIUM CHLORIDE 20 MEQ
20 PACKET (EA) ORAL ONCE
Qty: 0 | Refills: 0 | Status: COMPLETED | OUTPATIENT
Start: 2017-07-17 | End: 2017-07-17

## 2017-07-17 RX ORDER — FOLIC ACID 0.8 MG
1 TABLET ORAL DAILY
Qty: 0 | Refills: 0 | Status: DISCONTINUED | OUTPATIENT
Start: 2017-07-17 | End: 2017-07-20

## 2017-07-17 RX ORDER — THIAMINE MONONITRATE (VIT B1) 100 MG
100 TABLET ORAL DAILY
Qty: 0 | Refills: 0 | Status: DISCONTINUED | OUTPATIENT
Start: 2017-07-17 | End: 2017-07-20

## 2017-07-17 RX ADMIN — RIVAROXABAN 15 MILLIGRAM(S): KIT at 11:25

## 2017-07-17 RX ADMIN — Medication 20 MILLIEQUIVALENT(S): at 11:25

## 2017-07-17 RX ADMIN — Medication 0.12 MILLIGRAM(S): at 06:51

## 2017-07-17 RX ADMIN — Medication 0.6 MILLIGRAM(S): at 11:25

## 2017-07-17 RX ADMIN — AMIODARONE HYDROCHLORIDE 200 MILLIGRAM(S): 400 TABLET ORAL at 18:56

## 2017-07-17 RX ADMIN — AMIODARONE HYDROCHLORIDE 200 MILLIGRAM(S): 400 TABLET ORAL at 06:51

## 2017-07-17 RX ADMIN — Medication 100 MILLIGRAM(S): at 11:25

## 2017-07-17 RX ADMIN — LISINOPRIL 10 MILLIGRAM(S): 2.5 TABLET ORAL at 06:51

## 2017-07-17 NOTE — PROGRESS NOTE ADULT - SUBJECTIVE AND OBJECTIVE BOX
61 year old M poor historian w/ PMH of CHF, HTN, atrial fibrillation s/p ablation, HLD, EtOH abuse presenting to [facility] ED w/ SOB. Patient states he ran out of his medications this past Sunday and started to feel short of breath last night. He has worsening dyspnea on exertion. He also has been drinking a pint of vodka for the last 4 years daily. His last drink was last night. He denies a history of alcohol withdrawal seizures and is unsure if he has ever been intubated. He currently denies any nausea or vomiting, has some anxiety, denies hallucinations. In the ED, T 99,  (sinus) --> 107, /145 --> 137/88, RR 22, Sat 100%. In the ED, he was given 4 mg zofran, lopressor 5 mg IVP, , Mag 2 gm, lasix 40 mg, ativan 2 mg x2, and 1L NS bolus.     MY ASSESSMENT TODAY:  Rate controlled - on renally adjusted Rivaroxaban  Less drowsy - concerned about home situation  MSW working on placement    MEDICATIONS:  metoprolol 100 milliGRAM(s) Oral two times a day  amiodarone    Tablet 200 milliGRAM(s) Oral two times a day  digoxin     Tablet 0.125 milliGRAM(s) Oral daily  lisinopril 10 milliGRAM(s) Oral daily  LORazepam     Tablet 2 milliGRAM(s) Oral every 8 hours PRN  colchicine 0.6 milliGRAM(s) Oral daily  rivaroxaban 15 milliGRAM(s) Oral daily    [PHYSICAL EXAM:  TELEMETRY:  T(C): 37 (07-17-17 @ 12:33), Max: 37.1 (07-16-17 @ 21:38)  HR: 82 (07-17-17 @ 13:19) (78 - 98)  BP: 92/53 (07-17-17 @ 09:00) (92/53 - 119/77)  RR: 18 (07-17-17 @ 09:00) (16 - 33)  SpO2: 98% (07-17-17 @ 09:00) (96% - 100%)  Wt(kg): --  I&O's Summary    16 Jul 2017 07:01  -  17 Jul 2017 07:00  --------------------------------------------------------  IN: 560 mL / OUT: 1020 mL / NET: -460 mL    17 Jul 2017 07:01  -  17 Jul 2017 14:35  --------------------------------------------------------  IN: 200 mL / OUT: 300 mL / NET: -100 mL        Thornton:  Central/PICC/Mid Line:                                         Appearance: Normal	  HEENT:   Normal oral mucosa, PERRL, EOMI	  Neck: Supple, + JVD/ - JVD; Carotid Bruit   Cardiovascular: Normal S1 S2, No JVD, No murmurs,   Respiratory: Lungs clear to auscultation/Decreased Breath Sounds/No Rales, Rhonchi, Wheezing	  Gastrointestinal:  Soft, Non-tender, + BS	  Skin: No rashes, No ecchymoses, No cyanosis  Extremities: Normal range of motion, No clubbing, cyanosis or edema  Vascular: Peripheral pulses palpable 2+ bilaterally  Neurologic: Non-focal  Psychiatry: A & O x 3, Mood & affect appropriate      	    ECG:  	  RADIOLOGY:   DIAGNOSTIC TESTING:  [ ] Echocardiogram:  [ ]  Catheterization:  [ ] Stress Test:    [ ] LAKEISHA  OTHER: 	    LABS:	 	  CARDIAC MARKERS:                         11.2   8.4   )-----------( 205      ( 17 Jul 2017 07:02 )             34.3     07-17    140  |  100  |  54<H>  ----------------------------<  106<H>  3.7   |  24  |  2.20<H>    Ca    9.5      17 Jul 2017 07:03  Mg     2.1     07-17    TPro  7.5  /  Alb  3.3  /  TBili  1.0  /  DBili  x   /  AST  14  /  ALT  26  /  AlkPhos  70  07-17    proBNP:   Lipid Profile:   HgA1c:   TSH:       ASSESSMENT/PLAN: 	      61 year old M poor historian w/ PMH of CHF, HTN, atrial fibrillation s/p AF ablation with acute decompensated systolic CHF secondary to atrial flutter with rapid rates, non compliance and ? alcohol withdrawal    Rate control has improved    LFTs trending down    RECOMMEND    Improved CHF status  a. Continue Lisinopril  b. Metoprolol 100 mg BID  c. Digoxin to 0.125 mg QD    AF Management  - AAD therapy: Reduce Amiodarone to 200 mg PO QD on discharge  - AC: Reduced Xarelto dose based on eGFR  - Will not cardiovert at this time (acute illness) and concerns about patient compliance with anticoagulation    Address substance abuse and social issues    Follow up with Dr. Roberts post discharge   He needs a cardiologist as well - will d/w Dr. Bernal

## 2017-07-17 NOTE — PROGRESS NOTE ADULT - PROBLEM SELECTOR PLAN 1
C/o SOB for few days on admission w/ LOPEZ. BNP 4033. Etiology likely Acute on chronic systolic CHF likely secondary to medication noncompliance, Afib w/ -120s.  -Trop mildly elevated 0.04 likely in setting of acute systolic CHF exac, have trended down.  -EP Dr. Higuera following for Dr Roberts. F/u recs  -Echo performed 7/12/17 w/ EF 15-20%, mod dilated LV, severe global hypokinesis, mild-mod MR, mild TR.  -CXR w/ pulm vasc congestion on admission, repeat CXR improved.  -S/p IV diuresis w/ Lasix 40mg BID. Lasix on hold since 7/16 in the setting of elevated bun/creat. -Monitor strict I/Os, daily weights  -Lung exam improved. Weaned off 2L O2 to RA, maintaining SpO2 >95%. CPAP when sleeping.  -C/w Metoprolol 100mg BID PO  -C/w Lisinopril 10mg PO qd

## 2017-07-17 NOTE — PROGRESS NOTE ADULT - PROBLEM SELECTOR PLAN 2
Hx Afib ablation, managed by EP Dr. Kristofer Roberts, colleague Dr. Higuera is seeing patient   -Currently Afib rate controlled w/ HR in 's  -S/p Amio drip, continue Amiodarone 200mg BID PO inhospital, decrease to 200mg PO daily at discharge per Dr. Higuera. LFTs elevated on admission, now improved. Cont to monitor LFTs  -Dig level 1.0 (7/14), c/w maintenance PO Digoxin 0.125mg daily   -C/w Metoprolol 100mg BID PO  -Decreased Xarelto 15mg PO for anticoagulation, CrCl 44  -S/p LAKEISHA performed w/ no LA clot. Initially plan for DCCV today, now deferred as patient is well rate controlled on current meds.   -Hx sleep apnea per pt, placed on CPAP at night.

## 2017-07-17 NOTE — PROGRESS NOTE BEHAVIORAL HEALTH - ORIENTATION OTHER
initially AAO x 4, but within 30 min pt could no longer report location, situation, or time
initially AAO x 4, but within 30 min pt could no longer report location, situation, or time

## 2017-07-17 NOTE — PROGRESS NOTE BEHAVIORAL HEALTH - NSBHFUPINTERVALHXFT_PSY_A_CORE
On presentation today, pt is noticeably less sedated and answers questions appropriately. Denies anxiety at present, but does states he is concerned about his housing situation upon discharge. Of note, Librium 75 mg q8h was discontinued yesterday and since then pt has required 2 doses of Ativan 2 mg IV. At this time pt denies discomfort or s/s of ETOH WD. Option for inpatient rehab was once again discussed and pt continues to appear motivated towards this dispo. He continues to deny neurovegetative sx depression or sx of psychosis/ farnaz. Denies SI/HI/AH/VH. On presentation today, pt is AAO x 3, calm and cooperative, CIWA 1. Denies anxiety, neurovegetative sx of depression, or sx of psychosis/ farnaz or SI/HI/AH/VH. Upon inquiry regarding pt's motivation towards inpatient substance rehab, pt states that ETOH consumption has not interfered with day to day responsibilities necessitating rehab at this time. 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 When questioned about his previous concerns regarding impending homelessness, pt states that he is in fact able to return to his previous housing arrangement, but is concerned that the landlord may "kick me out in the future." Of note, standing Ativan was discontinued on 7/15 and pt has not required Ativan PRN since then.

## 2017-07-17 NOTE — PROGRESS NOTE ADULT - ASSESSMENT
61 year old M w/ PMH of CHF, HTN, atrial fibrillation s/p ablation, HLD, EtOH abuse presented to [facility] ED w/ medication noncompliance x1wk and SOB most likely 2/2 to acute on chronic systolic CHF exacerbation in setting of Afib with RVR and medication non-compliance.

## 2017-07-17 NOTE — PROGRESS NOTE BEHAVIORAL HEALTH - RISK ASSESSMENT
Pt is at low/ moderate risk given psychosocial stressors, ongoing substance use, and poor medication compliance. He does appear future oriented, denies SI, and identifies family as a primary reason for maintaining his health. Pt is at low/ moderate risk of harm to self given psychosocial stressors, ongoing substance use, and poor medication compliance. He does appear future oriented, denies SI, and identifies family as a primary reason for maintaining his health.

## 2017-07-17 NOTE — PROGRESS NOTE ADULT - PROBLEM SELECTOR PLAN 5
Anxiety and symptoms may be from cardiac issues vs possible ETOH withdrawal  -Psych consulted, F/u recs.  -S/p Ativan 2mg IV x 2. Was on standing PO Ativan 2mg q8h d/c'ed yesterday, now on q8h PRN as pt appears sleepy without s/s withdrawal

## 2017-07-17 NOTE — PROGRESS NOTE BEHAVIORAL HEALTH - NSBHCONSULTSUBSTANCEALCOHOL_PSY_A_CORE FT
Ativan 2 mg PO q8h  Ativan 2 mg PO q4h PRN s/s breakthrough ETOH WD s/s Ativan 2 mg q8PRN s/s ETOH WD

## 2017-07-17 NOTE — PROGRESS NOTE ADULT - PROBLEM SELECTOR PLAN 3
C/o chronic R knee pain x20yrs 2/2 trauma. Reports pain increased over past few weeks, ambulates with cane. On exam R knee swelling w/ increased warmth. Elevated WBC, sed rate, CRP. Fever 101.1. ORtho consulted for possible septic joint s/p aspiration 7/16 w/ Nucleated cell count 9400 and Uric acid crystals c/w acute R knee gout attack.   - Continue Colchicine 0.6mg qd, +/-NSAID  - Fever likely 22/ acute inflammatory process   - culture negative.

## 2017-07-17 NOTE — PROGRESS NOTE ADULT - PROBLEM SELECTOR PLAN 6
Anion gap metabolic acidosis possibly secondary to EtOH ketoacidosis vs elevated lactate.  - Improving. Will monitor.

## 2017-07-17 NOTE — PROGRESS NOTE ADULT - SUBJECTIVE AND OBJECTIVE BOX
Orthopaedics Progress Note    Contacted by primary team regarding incidental finding of left proximal fibula fracture and lateral tibia cortex fracture of indeterminate age. Pt denies acute trauma or injury to his left knee or left lower extremity. Denies left knee pain. Primary team states pt has ambulated without left knee pain - patient states he has not ambulated yet today however reports that he is able to weight bear without left knee pain. Orthopaedics was initially consulted for right knee pain that was diagnosed to be a gout flare.     PE: Patient laying in bed comfortable. No visible wounds/ecchymoses/abrasions to left lower extremity. No swelling of left knee appreciated. Non tender to palpation throughout left knee. Pt has full and painless active range of motion of left knee.     EXAM:  XR KNEE BILATERAL 3 VIEWS         Findings: 5 views of the bilateral knees were submitted. Degenerative   changes of the bilateral knees are noted with joint space narrowing and   osteophytosis. There is a fracture of the proximal aspect of the left   fibula and irregularity along the lateral cortex of the left tibia. No   significant soft tissue swelling is identified and these are   indeterminate age. Increased tilt and lateralization of the left patella   is present. Right-sided joint effusion is noted.    Indication:  Osteoarthritis of the knees with right knee effusion.    Fractures of the left proximal fibula and lateral tibial cortex of   indeterminate age.    A/P: 61M with left proximal fibula and lateral tibial cortex of indeterminate age  Discussed with Dr. Rocha  MRI left knee, primary team aware   WBAT, discussed with primary team that patient should be walked to validate that he is not having pain with ambulation  Will follow up results of MRI

## 2017-07-17 NOTE — PROGRESS NOTE BEHAVIORAL HEALTH - NSBHCHARTREVIEWVS_PSY_A_CORE FT
Vital Signs Last 24 Hrs  T(C): 36.3 (14 Jul 2017 08:52), Max: 37.4 (14 Jul 2017 05:44)  T(F): 97.3 (14 Jul 2017 08:52), Max: 99.4 (14 Jul 2017 05:44)  HR: 88 (14 Jul 2017 15:15) (80 - 106)  BP: 129/92 (14 Jul 2017 15:15) (108/61 - 145/67)  BP(mean): 107 (14 Jul 2017 15:15) (79 - 112)  RR: 18 (14 Jul 2017 15:15) (18 - 20)  SpO2: 96% (14 Jul 2017 15:15) (93% - 99%)
Vital Signs Last 24 Hrs  T(C): 37 (17 Jul 2017 12:33), Max: 37.1 (16 Jul 2017 21:38)  T(F): 98.6 (17 Jul 2017 12:33), Max: 98.8 (16 Jul 2017 21:38)  HR: 82 (17 Jul 2017 13:19) (78 - 98)  BP: 92/53 (17 Jul 2017 09:00) (92/53 - 119/77)  BP(mean): 69 (17 Jul 2017 09:00) (69 - 96)  RR: 18 (17 Jul 2017 09:00) (16 - 33)  SpO2: 98% (17 Jul 2017 09:00) (96% - 100%)

## 2017-07-17 NOTE — PROGRESS NOTE ADULT - PROBLEM SELECTOR PLAN 4
Pt w/ rectal temp 101.1 today. Pancultured. Likely 2/2 inflammatory process of acute gout flare up   -CXR, RVP, UA neg.  -Lactate WNL  -R knee fluid aspiration neg for septic joint. Uric crystals noted c/w acute gout flare up  -F/u BC x2, UCx results  -Monitor L hand/forearm redness/mild pain where previous IV was removed for phlebitis vs cellulitis  -No Abx at this time as unclear source of fever, possibly 2/2 inflammatory process of acute gout flareup

## 2017-07-17 NOTE — CONSULT NOTE ADULT - SUBJECTIVE AND OBJECTIVE BOX
Patient is a 61y old  Male who presents with a chief complaint of Shortness of Breath (12 Jul 2017 02:11)        HPI:  61 year old M w/ PMH of CHF, HTN, atrial fibrillation s/p ablation, HLD, EtOH abuse presented to [facility] ED w/ SOB. Patient states that he ran out of his medications this past Sunday and started to feel short of breath last night. He complains of worsening dyspnea on exertion, stating that he used to be able to walk 20-30 yards before becoming SOB and not tires out much sooner. He had a similar episode of SOB in February for which he was seen at [facility] and diagnosed with a heart failure exacerbation. He also has been drinking a pint of vodka for the last 4 years daily. His last drink was 8PM Monday evening. He denies a history of alcohol withdrawal seizures and is unsure if he has ever been intubated. He currently denies any CP, nausea, vomiting, abd pain, headache, or hallucinations, but states that he feels very anxious.     In the ED, Tmax 99,  - 143 (sinus) 107, /77 - 189/145, RR 18 - 22, Sat 97% - 100%. In the ED, he was given 4 mg Zofran, Lopressor 5 mg IVP, , Mag 2 gm, Lasix 40 mg, Aivan 2 mg x3, and 1L NS banana bag x2, Librium 50mg and Valium 10mg. (12 Jul 2017 02:11)     Right knee pain- confusion    CT head negastive      Allergies  No Known Allergies      Health Issues  ALCOHOL WITHDRAWAL, ELEVATED TROPONIN; SHORTNESS  H/o or current diagnosis of HF- ACEI/ARB contraindication unknown  No pertinent family history in first degree relatives  Handoff  MEWS Score  HLD (hyperlipidemia)  Atrial fibrillation, unspecified type  Congestive heart failure  Essential hypertension  Alcohol withdrawal  Fever  Knee pain, right  Atrial fibrillation  Acute on chronic systolic (congestive) heart failure  Discharge planning issues  Anxiety  Delirium  Alcohol use disorder, severe, dependence  Atrial flutter, unspecified type  Acidosis, metabolic  Nutrition, metabolism, and development symptoms  Need for prophylactic measure  HLD (hyperlipidemia)  Essential hypertension  Congestive heart failure  Atrial fibrillation, unspecified type  Elevated troponin  Shortness of breath  Alcohol withdrawal  No significant past surgical history  SOB  90+  Shortness of breath  Elevated troponin        FAMILY HISTORY:  No pertinent family history in first degree relatives      MEDICATIONS  (STANDING):  metoprolol 100 milliGRAM(s) Oral two times a day  amiodarone    Tablet 200 milliGRAM(s) Oral two times a day  digoxin     Tablet 0.125 milliGRAM(s) Oral daily  lisinopril 10 milliGRAM(s) Oral daily  colchicine 0.6 milliGRAM(s) Oral daily  rivaroxaban 15 milliGRAM(s) Oral daily    MEDICATIONS  (PRN):  LORazepam     Tablet 2 milliGRAM(s) Oral every 8 hours PRN Agitation      PAST MEDICAL & SURGICAL HISTORY:  HLD (hyperlipidemia)  Atrial fibrillation, unspecified type  Congestive heart failure: CHF (congestive heart failure)  Essential hypertension: HTN (hypertension)  No significant past surgical history      Labs                          11.2   8.4   )-----------( 205      ( 17 Jul 2017 07:02 )             34.3     07-17    140  |  100  |  54<H>  ----------------------------<  106<H>  3.7   |  24  |  2.20<H>    Ca    9.5      17 Jul 2017 07:03  Mg     2.1     07-17    TPro  7.5  /  Alb  3.3  /  TBili  1.0  /  DBili  x   /  AST  14  /  ALT  26  /  AlkPhos  70  07-17      Radiology:    Physical Exam    MENTAL STATUS  -Level of Consciousness- awake    Orientation- person, place time  Language- aphasia/ dysarthria  Memory- recent and remote- poor      Cranial Nerve 1- 12  Pupils- equal and reactive  Eye movements-full EOM  Facial - no asymmetry   Lower CN-nl    Gait and Station-unsteady    MOTOR  Upper-nl  Lower-no foot drop    Reflexes decreased    Sensation- no sensory level    Cerebellar-mild tremors    vascular -no bruits    Assessment- MCI- Gait ataxia    Plan Thiamine, B12, TSH, B12- Rehab

## 2017-07-17 NOTE — PROGRESS NOTE BEHAVIORAL HEALTH - SUMMARY
60 y/o M with long standing h/o ETOH use and multiple cardiac comorbidities, currently admitted for treatment of SOB in the setting of medication noncompliance. Initial psychiatry consult requested for evaluation of anxiety. While pt may suffer from baseline levels anxiety, formal diagnosis is limited by pt's ongoing substance use. Current feelings of "stress" appear related to impending homelessness. Options for inpatient rehab have been discussed and pt appears amendable. Given h/o significant daily ETOH consumption would recommend standing Ativan with additional Ativan PRN available. 60 y/o M with long standing h/o ETOH use, currently with limited insight into his ETOH use. Writer engaged pt in motivational interviewing and pt appears to be in the precontemplation stage, as evidenced by rationalization of pt's ongoing substance use. Pt had previously expressed concerns about impending homeless, which today was clarified to be a possible concern in the future, but at present pt does have stable housing to which he may return. Re: anxiety for which psychiatry was initially consulted, pt is denying anxiety today, suggesting previous concerns were in the context of CHF exacerbation/ SOB. Outpatient psychiatric f/u has been offered, but pt does not appear amendable at this time. From a psychiatric standpoint, pt does not present contraindications to discharge home once medically stable. Please reconsult for further questions/ concerns.

## 2017-07-17 NOTE — PROGRESS NOTE BEHAVIORAL HEALTH - NSBHATTESTSEENBY_PSY_A_CORE
Trainee with telephonic supervision from Attending Psychiatrist
Trainee with telephonic supervision from Attending Psychiatrist

## 2017-07-17 NOTE — PROGRESS NOTE ADULT - SUBJECTIVE AND OBJECTIVE BOX
CARDIOLOGY NP PROGRESS NOTE    Subjective: PT seen and examined at bedside. Denies chest pain, dizziness, palpitations.     Overnight Events: No events.     TELEMETRY: Afib, rate 70's.       VITAL SIGNS:  T(C): 37 (07-17-17 @ 12:33), Max: 37.3 (07-16-17 @ 14:00)  HR: 82 (07-17-17 @ 09:00) (78 - 98)  BP: 92/53 (07-17-17 @ 09:00) (92/53 - 119/77)  RR: 18 (07-17-17 @ 09:00) (16 - 33)  SpO2: 98% (07-17-17 @ 09:00) (96% - 100%)  Wt(kg): --    I&O's Summary    16 Jul 2017 07:01  -  17 Jul 2017 07:00  --------------------------------------------------------  IN: 560 mL / OUT: 1020 mL / NET: -460 mL    17 Jul 2017 07:01  -  17 Jul 2017 13:07  --------------------------------------------------------  IN: 200 mL / OUT: 0 mL / NET: 200 mL          PHYSICAL EXAM:    General: A/ox 3, No acute Distress  Neck: Supple, NO JVD  Cardiac: S1 S2, No M/R/G  Pulmonary: CTAB, Breathing unlabored, No Rhonchi/Rales/Wheezing  Abdomen: Soft, Non -tender, +BS x 4 quads  Extremities: No Rashes, No edema  Neuro: A/o x 3, No focal deficits          LABS:                          11.2   8.4   )-----------( 205      ( 17 Jul 2017 07:02 )             34.3                              07-17    140  |  100  |  54<H>  ----------------------------<  106<H>  3.7   |  24  |  2.20<H>    Ca    9.5      17 Jul 2017 07:03  Mg     2.1     07-17    TPro  7.5  /  Alb  3.3  /  TBili  1.0  /  DBili  x   /  AST  14  /  ALT  26  /  AlkPhos  70  07-17    LIVER FUNCTIONS - ( 17 Jul 2017 07:03 )  Alb: 3.3 g/dL / Pro: 7.5 g/dL / ALK PHOS: 70 U/L / ALT: 26 U/L / AST: 14 U/L / GGT: x                                   CAPILLARY BLOOD GLUCOSE  120 (16 Jul 2017 17:16)                  No Known Allergies    MEDICATIONS  (STANDING):  metoprolol 100 milliGRAM(s) Oral two times a day  amiodarone    Tablet 200 milliGRAM(s) Oral two times a day  digoxin     Tablet 0.125 milliGRAM(s) Oral daily  lisinopril 10 milliGRAM(s) Oral daily  colchicine 0.6 milliGRAM(s) Oral daily  rivaroxaban 15 milliGRAM(s) Oral daily    MEDICATIONS  (PRN):  LORazepam     Tablet 2 milliGRAM(s) Oral every 8 hours PRN Agitation        DIAGNOSTIC TESTS:

## 2017-07-17 NOTE — PROGRESS NOTE BEHAVIORAL HEALTH - NSBHCONSULTFOLLOWAFTERCARE_PSY_A_CORE FT
Inpatient Substance Rehab Pt would benefit from Inpatient Substance Rehab but does not appear amendable at this time.

## 2017-07-18 LAB
ANION GAP SERPL CALC-SCNC: 14 MMOL/L — SIGNIFICANT CHANGE UP (ref 5–17)
BUN SERPL-MCNC: 54 MG/DL — HIGH (ref 7–23)
CALCIUM SERPL-MCNC: 9.7 MG/DL — SIGNIFICANT CHANGE UP (ref 8.4–10.5)
CHLORIDE SERPL-SCNC: 101 MMOL/L — SIGNIFICANT CHANGE UP (ref 96–108)
CO2 SERPL-SCNC: 23 MMOL/L — SIGNIFICANT CHANGE UP (ref 22–31)
CREAT SERPL-MCNC: 2 MG/DL — HIGH (ref 0.5–1.3)
GLUCOSE SERPL-MCNC: 98 MG/DL — SIGNIFICANT CHANGE UP (ref 70–99)
HCT VFR BLD CALC: 34.5 % — LOW (ref 39–50)
HGB BLD-MCNC: 11.4 G/DL — LOW (ref 13–17)
MAGNESIUM SERPL-MCNC: 2.2 MG/DL — SIGNIFICANT CHANGE UP (ref 1.6–2.6)
MCHC RBC-ENTMCNC: 31.5 PG — SIGNIFICANT CHANGE UP (ref 27–34)
MCHC RBC-ENTMCNC: 33 G/DL — SIGNIFICANT CHANGE UP (ref 32–36)
MCV RBC AUTO: 95.3 FL — SIGNIFICANT CHANGE UP (ref 80–100)
PLATELET # BLD AUTO: 236 K/UL — SIGNIFICANT CHANGE UP (ref 150–400)
POTASSIUM SERPL-MCNC: 4.1 MMOL/L — SIGNIFICANT CHANGE UP (ref 3.5–5.3)
POTASSIUM SERPL-SCNC: 4.1 MMOL/L — SIGNIFICANT CHANGE UP (ref 3.5–5.3)
RBC # BLD: 3.62 M/UL — LOW (ref 4.2–5.8)
RBC # FLD: 14.5 % — SIGNIFICANT CHANGE UP (ref 10.3–16.9)
SODIUM SERPL-SCNC: 138 MMOL/L — SIGNIFICANT CHANGE UP (ref 135–145)
WBC # BLD: 7 K/UL — SIGNIFICANT CHANGE UP (ref 3.8–10.5)
WBC # FLD AUTO: 7 K/UL — SIGNIFICANT CHANGE UP (ref 3.8–10.5)

## 2017-07-18 PROCEDURE — 99233 SBSQ HOSP IP/OBS HIGH 50: CPT

## 2017-07-18 PROCEDURE — 73721 MRI JNT OF LWR EXTRE W/O DYE: CPT | Mod: 26,LT

## 2017-07-18 RX ORDER — FUROSEMIDE 40 MG
20 TABLET ORAL DAILY
Qty: 0 | Refills: 0 | Status: DISCONTINUED | OUTPATIENT
Start: 2017-07-18 | End: 2017-07-18

## 2017-07-18 RX ADMIN — Medication 0.12 MILLIGRAM(S): at 05:59

## 2017-07-18 RX ADMIN — Medication 100 MILLIGRAM(S): at 12:10

## 2017-07-18 RX ADMIN — Medication 20 MILLIGRAM(S): at 17:26

## 2017-07-18 RX ADMIN — AMIODARONE HYDROCHLORIDE 200 MILLIGRAM(S): 400 TABLET ORAL at 17:26

## 2017-07-18 RX ADMIN — Medication 10 MILLIGRAM(S): at 22:46

## 2017-07-18 RX ADMIN — Medication 1 TABLET(S): at 12:10

## 2017-07-18 RX ADMIN — RIVAROXABAN 15 MILLIGRAM(S): KIT at 12:10

## 2017-07-18 RX ADMIN — Medication 10 MILLIGRAM(S): at 09:21

## 2017-07-18 RX ADMIN — AMIODARONE HYDROCHLORIDE 200 MILLIGRAM(S): 400 TABLET ORAL at 05:59

## 2017-07-18 RX ADMIN — Medication 1 MILLIGRAM(S): at 12:10

## 2017-07-18 RX ADMIN — LISINOPRIL 10 MILLIGRAM(S): 2.5 TABLET ORAL at 12:15

## 2017-07-18 RX ADMIN — Medication 100 MILLIGRAM(S): at 11:18

## 2017-07-18 RX ADMIN — Medication 100 MILLIGRAM(S): at 22:46

## 2017-07-18 NOTE — PROGRESS NOTE ADULT - PROBLEM SELECTOR PLAN 1
C/o SOB for few days on admission w/ LOPEZ. BNP 4033. Etiology likely Acute on chronic systolic CHF likely secondary to medication noncompliance, Afib w/ -120s.  -Trop mildly elevated 0.04 likely in setting of acute systolic CHF exac, have trended down.  -Echo performed 7/12/17 w/ EF 15-20%, mod dilated LV, severe global hypokinesis, mild-mod MR, mild TR.  Etiology likely tachyinduced CM vs ETOH induced. Worked up in the outpatient setting.  -CXR w/ pulm vasc congestion on admission, repeat CXR improved.  -S/p IV diuresis w/ Lasix 40mg BID. Lasix on hold since 7/16 in the setting of elevated bun/creat. -Monitor strict I/Os, daily weights  -Lung exam improved. Weaned off 2L O2 to RA, maintaining SpO2 >95%. CPAP when sleeping.  -C/w Metoprolol 100mg BID PO  -C/w Lisinopril 10mg PO qd C/o SOB for few days on admission w/ LOPEZ. BNP 4033. Etiology likely Acute on chronic systolic CHF likely secondary to medication noncompliance, Afib w/ -120s.  -Trop mildly elevated 0.04 likely in setting of acute systolic CHF exac, have trended down.  -Echo performed 7/12/17 w/ EF 15-20%, mod dilated LV, severe global hypokinesis, mild-mod MR, mild TR.  Etiology likely tachyinduced CM vs ETOH induced. Worked up in the outpatient setting per DR. Higuera.   -CXR w/ pulm vasc congestion on admission, repeat CXR improved.  -S/p IV diuresis w/ Lasix 40mg BID.  - Clinically looks euvolemic.  Lasix on hold since 7/16 in the setting of elevated bun/creat and gout flare. Consider restarting today.  - Monitor strict I/Os, daily weights.   -Lung exam improved. Weaned off 2L O2 to RA, maintaining SpO2 >95%. CPAP when sleeping.  -C/w Metoprolol 100mg BID PO  -C/w Lisinopril 10mg PO qd  - Outpatient evaluation of AICD in 3 months.

## 2017-07-18 NOTE — PROGRESS NOTE ADULT - SUBJECTIVE AND OBJECTIVE BOX
Patient seen and examined at bedside.    No acute events.  Pain tolerable.     Denies chest pain/SOB.  No headache.  Tolerating PO.    T(C): 36.6 (07-18-17 @ 10:37), Max: 37 (07-17-17 @ 12:33)  T(F): 97.8 (07-18-17 @ 10:37), Max: 98.6 (07-17-17 @ 12:33)  HR:  (82 - 96)  BP:  (85/49 - 109/72)  RR:  (18 - 24)  SpO2:  (97% - 100%)  Wt(kg): --    NAD, non labored respirations.  Abd soft, NTND.  NVID   wwp and SILT                              11.4<L>  7.0   )-------------------( 236      ( 07-18 @ 07:06 )                 34.5<L>      I&O's Detail    17 Jul 2017 07:01  -  18 Jul 2017 07:00  --------------------------------------------------------  IN:    Oral Fluid: 537 mL  Total IN: 537 mL    OUT:    Incontinent per Condom Catheter: 500 mL  Total OUT: 500 mL    Total NET: 37 mL      Plan:    Will review the MRI once done.    Continue with WBAT.  Please call with any additional questions.

## 2017-07-18 NOTE — PROGRESS NOTE ADULT - SUBJECTIVE AND OBJECTIVE BOX
· Subjective and Objective: 	  HISTORY OF PRESENT ILLNESS:     Pt is a 60 y/o m with multiple cardiac,renal problems that makes treatment for his polyarthritis very difficult . He has multiple addmissions for congestive heart failure which usually starts when he is drinking or runs out of his medication. He doesn't always stay in a protective environment.  He now has gouty arthritis with fluid aspirated from his Rt knee show crystals of Urate. He still has swelling on colchicine and active arthritic pain. His condition is also complicated by secondary diarrhea to the colchicine    PAST MEDICAL & SURGICAL HISTORY:  HLD (hyperlipidemia)  Atrial fibrillation, unspecified type  Congestive heart failure: CHF (congestive heart failure)  Essential hypertension: HTN (hypertension)  No significant past surgical history      REVIEW OF SYSTEMS      General:  confused  with poor memory	  Now he has C/O pain in right big toe more than left and unable to get OOB and put feet on floor due to pain      HEENT  poor conversation  Neck supple  Right foot  tender w pain on passive ROM R big toe.  L neg  Feet no swelling  Knees  not tender at present

## 2017-07-18 NOTE — PROGRESS NOTE ADULT - PROBLEM SELECTOR PLAN 2
Hx Afib ablation, managed by EP Dr. Kristofer Roberts, colleague Dr. Higuera is seeing patient   -Currently Afib rate controlled w/ HR in 's  -S/p Amio drip, continue Amiodarone 200mg BID PO inhospital, decrease to 200mg PO daily at discharge per Dr. Higuera. LFTs elevated on admission, now improved. Cont to monitor LFTs  -Dig level 1.0 (7/14), c/w maintenance PO Digoxin 0.125mg daily   -C/w Metoprolol 100mg BID PO  -Decreased Xarelto 15mg PO for anticoagulation, CrCl 44  -S/p LAKEISHA performed w/ no LA clot. Initially plan for DCCV today, now deferred as patient is well rate controlled on current meds.   -Hx sleep apnea per pt, placed on CPAP at night. Hx Afib ablation, managed by EP Dr. Kristofer Roberts, colleague Dr. Higuera is seeing patient   -Currently Afib rate controlled w/ HR in 's  -S/p Amio drip, continue Amiodarone 200mg BID PO inhospital, decrease to 200mg PO daily at discharge per Dr. Hgiuera. LFTs elevated on admission, now improved. Cont to monitor LFTs  -Dig level 1.0 (7/14), c/w maintenance PO Digoxin 0.125mg daily   -C/w Metoprolol 100mg BID PO  - Continue  Xarelto 15mg PO for anticoagulation, CrCl 44  -S/p LAKEISHA performed w/ no LA clot. Initially plan for DCCV today, now deferred as patient is well rate controlled on current meds and has a history of non compliance.  -Hx sleep apnea per pt, placed on CPAP at night.

## 2017-07-18 NOTE — PROGRESS NOTE ADULT - PROBLEM SELECTOR PLAN 6
Anion gap metabolic acidosis possibly secondary to EtOH ketoacidosis vs elevated lactate.  - Improving. Will monitor. SBP 99-140s  -C/w lisinopril, metoprolol

## 2017-07-18 NOTE — PROGRESS NOTE ADULT - ASSESSMENT
Pt w HX of Gout and CHF alcohol abuse and multiple med problems who is poorly compliant w meds and self care now w attack of gout.    Cont prednisone for few days

## 2017-07-18 NOTE — CHART NOTE - NSCHARTNOTEFT_GEN_A_CORE
Notified about RN in regards to patient's SBP in 70's while OOB sitting in chair. Patient was asymptomatic at that time, placed back in bed and BP repeated . Patient seen and examined. Denies any c/o chest pain, dizziness, palpitaitons- likely orthostatic from overdiuresis. Lasix d/c'd, encourage PO hydration as patient asymptomatic. NO IVF in the setting of low EF. Will monitor. Notified by RN [facility] in regards to patient's SBP in 70's while OOB sitting in chair. Patient was asymptomatic at that time, placed back in bed and BP repeated . Patient seen and examined. Denies any c/o chest pain, dizziness, palpitaitons- likely orthostatic from over diuresis. Lasix d/c'd, encourage PO hydration as patient asymptomatic. NO IVF for now in the setting of low EF. Will monitor.

## 2017-07-18 NOTE — PROGRESS NOTE ADULT - PROBLEM SELECTOR PLAN 9
Dash/TLC Diet, replete lytes PRN, keep K >4, Mg >2    FULL CODE Discharge pending clinical progress    PT consult: pt reports ambulates w/ cane  Social work consult: Alcohol rehab, concerns about losing his residence 2/2 missing rent

## 2017-07-18 NOTE — PROGRESS NOTE ADULT - PROBLEM SELECTOR PLAN 5
Anxiety and symptoms may be from cardiac issues vs possible ETOH withdrawal  -Psych consulted, F/u recs.  -S/p Ativan 2mg IV x 2. Was on standing PO Ativan 2mg q8h d/c'ed yesterday, now on q8h PRN as pt appears sleepy without s/s withdrawal Anxiety and symptoms may be from cardiac issues vs possible ETOH withdrawal  -Psych consulted, F/u recs.  -Continue ativan every 8 hours PRN

## 2017-07-18 NOTE — PROGRESS NOTE ADULT - PROBLEM SELECTOR PLAN 10
Discharge pending clinical progress    PT consult: pt reports ambulates w/ cane  Social work consult: Alcohol rehab, concerns about losing his residence 2/2 missing rent

## 2017-07-18 NOTE — PROGRESS NOTE ADULT - PROBLEM SELECTOR PLAN 4
Pt w/ rectal temp 101.1 today. Pancultured. Likely 2/2 inflammatory process of acute gout flare up   -CXR, RVP, UA neg.  -Lactate WNL  -R knee fluid aspiration neg for septic joint. Uric crystals noted c/w acute gout flare up  -F/u BC x2, UCx results  -Monitor L hand/forearm redness/mild pain where previous IV was removed for phlebitis vs cellulitis  -No Abx at this time as unclear source of fever, possibly 2/2 inflammatory process of acute gout flareup Pt w/ rectal temp 101.1 today. Pancultured. Likely 2/2 inflammatory process of acute gout flare up   -CXR, RVP, UA neg.  -Lactate WNL  -R knee fluid aspiration neg for septic joint. Uric crystals noted c/w acute gout flare up  -F/u BC x2, UCx results- negative to date.   -No Abx at this time as unclear source of fever, possibly 2/2 inflammatory process of acute gout flareup

## 2017-07-18 NOTE — PROGRESS NOTE ADULT - ASSESSMENT
61 year old M w/ PMH of CHF, HTN, atrial fibrillation s/p ablation, HLD, EtOH abuse presented to [facility] ED w/ medication noncompliance x1wk and SOB most likely 2/2 to acute on chronic systolic CHF exacerbation in setting of Afib with RVR and medication non-compliance.

## 2017-07-18 NOTE — PROGRESS NOTE ADULT - SUBJECTIVE AND OBJECTIVE BOX
Neurology Follow up note    Name  DOUGLAS FAITH    HPI:  61 year old M w/ PMH of CHF, HTN, atrial fibrillation s/p ablation, HLD, EtOH abuse presented to [facility] ED w/ SOB. Patient states that he ran out of his medications this past Sunday and started to feel short of breath last night. He complains of worsening dyspnea on exertion, stating that he used to be able to walk 20-30 yards before becoming SOB and not tires out much sooner. He had a similar episode of SOB in February for which he was seen at [facility] and diagnosed with a heart failure exacerbation. He also has been drinking a pint of vodka for the last 4 years daily. His last drink was 8PM Monday evening. He denies a history of alcohol withdrawal seizures and is unsure if he has ever been intubated. He currently denies any CP, nausea, vomiting, abd pain, headache, or hallucinations, but states that he feels very anxious.     In the ED, Tmax 99,  - 143 (sinus) 107, /77 - 189/145, RR 18 - 22, Sat 97% - 100%. In the ED, he was given 4 mg Zofran, Lopressor 5 mg IVP, , Mag 2 gm, Lasix 40 mg, Aivan 2 mg x3, and 1L NS banana bag x2, Librium 50mg and Valium 10mg. (12 Jul 2017 02:11)      Interval History - no change in neuro symptom Spoke to Dr Tejada        REVIEW OF SYSTEMS    Vital Signs Last 24 Hrs  T(C): 36.8 (18 Jul 2017 05:38), Max: 37 (17 Jul 2017 09:00)  T(F): 98.3 (18 Jul 2017 05:38), Max: 98.6 (17 Jul 2017 09:00)  HR: 84 (18 Jul 2017 05:54) (82 - 96)  BP: 95/55 (18 Jul 2017 05:54) (85/49 - 109/72)  BP(mean): 77 (18 Jul 2017 05:54) (65 - 94)  RR: 18 (18 Jul 2017 05:54) (18 - 24)  SpO2: 98% (18 Jul 2017 05:54) (97% - 100%)    Physical Exam-     Mental Status- no aphasia    Cranial Nerves- full EOM    Gait and station-unsteady    Motor- moves all 4 extremities    Reflexes- decreased    Sensation- distal sensory loss    Coordination- no tremors    Vascular -    Medications  metoprolol 100 milliGRAM(s) Oral two times a day  amiodarone    Tablet 200 milliGRAM(s) Oral two times a day  digoxin     Tablet 0.125 milliGRAM(s) Oral daily  lisinopril 10 milliGRAM(s) Oral daily  LORazepam     Tablet 2 milliGRAM(s) Oral every 8 hours PRN  rivaroxaban 15 milliGRAM(s) Oral daily  folic acid 1 milliGRAM(s) Oral daily  thiamine 100 milliGRAM(s) Oral daily  multivitamin 1 Tablet(s) Oral daily  predniSONE   Tablet 10 milliGRAM(s) Oral every 12 hours      Lab      Radiology    Assessment- Peripheral neuropathy   probable spinal stenosis    Plan- Rehab    Plan

## 2017-07-18 NOTE — PROGRESS NOTE ADULT - SUBJECTIVE AND OBJECTIVE BOX
HISTORY OF PRESENT ILLNESS:     Pt is a 62 y/o m with multiple cardiac,renal problems that makes treatment for his polyarthritis very difficult . He has multiple addmissions for congestive heart failure which usually starts when he is drinking or runs out of his medication. He doesn't always stay in a protictive enviorment.  He now has gouty arthritis with fluid aspirated from his Rt knee show crystals of Urate. He still has swelling on colchicine and active arthritic pain. His condition is also complicated by secondary diarrhea to the colchicine    PAST MEDICAL & SURGICAL HISTORY:  HLD (hyperlipidemia)  Atrial fibrillation, unspecified type  Congestive heart failure: CHF (congestive heart failure)  Essential hypertension: HTN (hypertension)  No significant past surgical history      REVIEW OF SYSTEMS      General:  confused  with poor memory	    Skin/Breast:  	  Ophthalmologic:  	  ENMT:	    Respiratory and Thorax:  	  Cardiovascular:	    Gastrointestinal:	    Genitourinary:	    Musculoskeletal:	    Neurological:	    Psychiatric:	    Hematology/Lymphatics:	    Endocrine:	    Allergic/Immunologic:	    MEDICATIONS  (STANDING):  metoprolol 100 milliGRAM(s) Oral two times a day  amiodarone    Tablet 200 milliGRAM(s) Oral two times a day  digoxin     Tablet 0.125 milliGRAM(s) Oral daily  lisinopril 10 milliGRAM(s) Oral daily  colchicine 0.6 milliGRAM(s) Oral daily  rivaroxaban 15 milliGRAM(s) Oral daily  folic acid 1 milliGRAM(s) Oral daily  thiamine 100 milliGRAM(s) Oral daily  multivitamin 1 Tablet(s) Oral daily    MEDICATIONS  (PRN):  LORazepam     Tablet 2 milliGRAM(s) Oral every 8 hours PRN Agitation      Allergies    No Known Allergies    Intolerances        PERTINENT MEDICATION HISTORY:    SOCIAL HISTORY:    FAMILY HISTORY:  No pertinent family history in first degree relatives      ICU Vital Signs Last 24 Hrs  T(C): 36.8 (2017 05:38), Max: 37 (2017 09:00)  T(F): 98.3 (2017 05:38), Max: 98.6 (2017 09:00)  HR: 84 (2017 05:54) (82 - 96)  BP: 95/55 (2017 05:54) (85/49 - 109/72)  BP(mean): 77 (2017 05:54) (65 - 94)  ABP: --  ABP(mean): --  RR: 18 (2017 05:54) (18 - 24)  SpO2: 98% (2017 05:54) (97% - 100%)      Daily     Daily Weight in k.6 (2017 13:22)    PHYSICAL EXAM:      Constitutional:    Eyes:    ENMT:    Neck:    Breasts:    Back:    Respiratory:    Cardiovascular: atrial fib    Gastrointestinal:    Genitourinary:    Rectal:    Extremities: active  synovitis  and RT knee effusion                     Lt ankle  synovitis    Vascular:    Neurological:    Skin:    Lymph Nodes:    Musculoskeletal:    Psychiatric:        LABS:                        11.2   8.4   )-----------( 205      ( 2017 07:02 )             34.3           140  |  100  |  54<H>  ----------------------------<  106<H>  3.7   |  24  |  2.20<H>    Ca    9.5      2017 07:03  Mg     2.1         TPro  7.5  /  Alb  3.3  /  TBili  1.0  /  DBili  x   /  AST  14  /  ALT  26  /  AlkPhos  70          Urinalysis Basic - ( 2017 08:49 )    Color: Yellow / Appearance: Clear / S.020 / pH: x  Gluc: x / Ketone: NEGATIVE  / Bili: NEGATIVE / Urobili: 1.0 E.U./dL   Blood: x / Protein: Trace mg/dL / Nitrite: NEGATIVE   Leuk Esterase: NEGATIVE / RBC: > 10 /HPF / WBC < 5 /HPF   Sq Epi: x / Non Sq Epi: Rare /HPF / Bacteria: Present /HPF        RADIOLOGY & ADDITIONAL STUDIES:

## 2017-07-18 NOTE — PROGRESS NOTE ADULT - ASSESSMENT
polyarticular gout  that is active on colchicine which is probably causing diarrhea.      He is very unreliable with medication but would obtain a 24 hr urin for uric acid as he is likly a stone former      Would  stop colchcine and replace with prednisone 10 mg  qd  which can be given bid for the first 3 days  then tapered            follow  BP and sugars       Start  Uloric  40 mg  qd after obtaining 24 he urine on 7/19/17. would not use allopurinol because of his elevated creatine 2.2       and education  -- education   and backup or he will end up in hospital again

## 2017-07-18 NOTE — PROGRESS NOTE ADULT - PROBLEM SELECTOR PLAN 3
C/o chronic R knee pain x20yrs 2/2 trauma. Reports pain increased over past few weeks, ambulates with cane. On exam R knee swelling w/ increased warmth. Elevated WBC, sed rate, CRP. Fever 101.1. ORtho consulted for possible septic joint s/p aspiration 7/16 w/ Nucleated cell count 9400 and Uric acid crystals c/w acute R knee gout attack.   - Continue Colchicine 0.6mg qd, +/-NSAID  - Fever likely 22/ acute inflammatory process   - culture negative. C/o chronic R knee pain x20yrs 2/2 trauma. Reports pain increased over past few weeks, ambulates with cane. On exam R knee swelling w/ increased warmth. Elevated WBC, sed rate, CRP. Fever 101.1. ORtho consulted for possible septic joint s/p aspiration 7/16 w/ Nucleated cell count 9400 and Uric acid crystals c/w acute R knee gout attack.   - Rheum on board (Dr. Tejada); per recs, Colchicine d/c'd in the setting of diarrhea. Started on Prednisone 10 mg BID x 3 days and taper afterwards.  - f/u 24 hour uric acid collection. Urolic 40 mg to be started tomm post collection.  - Fever likely 22/ acute inflammatory process. Blood/aspirate culture negative.

## 2017-07-18 NOTE — PROGRESS NOTE ADULT - SUBJECTIVE AND OBJECTIVE BOX
CARDIOLOGY NP PROGRESS NOTE    Subjective: Pt seen and examined at bedside. Denies chest pain, dizziness, palpitations.     Overnight Events: No events.     TELEMETRY: rate controlled atrial fibrillation  EKG:      VITAL SIGNS:  T(C): 36.6 (07-18-17 @ 10:37), Max: 37 (07-17-17 @ 12:33)  HR: 86 (07-18-17 @ 11:15) (82 - 96)  BP: 109/69 (07-18-17 @ 11:15) (85/49 - 109/72)  RR: 18 (07-18-17 @ 11:15) (18 - 24)  SpO2: 97% (07-18-17 @ 11:15) (97% - 100%)  Wt(kg): --    I&O's Summary    17 Jul 2017 07:01  -  18 Jul 2017 07:00  --------------------------------------------------------  IN: 537 mL / OUT: 500 mL / NET: 37 mL          PHYSICAL EXAM:    General: A/ox 3, No acute Distress  Neck: Supple, NO JVD  Cardiac: S1 S2, No M/R/G  Pulmonary: CTAB, Breathing unlabored, No Rhonchi/Rales/Wheezing  Abdomen: Soft, Non -tender, +BS x 4 quads  Extremities: No Rashes, No edema, right knee swelling improved.   Neuro: A/o x 3, No focal deficits          LABS:                          11.4   7.0   )-----------( 236      ( 18 Jul 2017 07:06 )             34.5                              07-18    138  |  101  |  54<H>  ----------------------------<  98  4.1   |  23  |  2.00<H>    Ca    9.7      18 Jul 2017 07:06  Mg     2.2     07-18    TPro  7.5  /  Alb  3.3  /  TBili  1.0  /  DBili  x   /  AST  14  /  ALT  26  /  AlkPhos  70  07-17    LIVER FUNCTIONS - ( 17 Jul 2017 07:03 )  Alb: 3.3 g/dL / Pro: 7.5 g/dL / ALK PHOS: 70 U/L / ALT: 26 U/L / AST: 14 U/L / GGT: x                                   CAPILLARY BLOOD GLUCOSE  84 (17 Jul 2017 16:10)                  No Known Allergies    MEDICATIONS  (STANDING):  metoprolol 100 milliGRAM(s) Oral two times a day  amiodarone    Tablet 200 milliGRAM(s) Oral two times a day  digoxin     Tablet 0.125 milliGRAM(s) Oral daily  lisinopril 10 milliGRAM(s) Oral daily  rivaroxaban 15 milliGRAM(s) Oral daily  folic acid 1 milliGRAM(s) Oral daily  thiamine 100 milliGRAM(s) Oral daily  multivitamin 1 Tablet(s) Oral daily  predniSONE   Tablet 10 milliGRAM(s) Oral every 12 hours    MEDICATIONS  (PRN):  LORazepam     Tablet 2 milliGRAM(s) Oral every 8 hours PRN Agitation        DIAGNOSTIC TESTS:

## 2017-07-19 DIAGNOSIS — I95.1 ORTHOSTATIC HYPOTENSION: ICD-10-CM

## 2017-07-19 DIAGNOSIS — M10.9 GOUT, UNSPECIFIED: ICD-10-CM

## 2017-07-19 LAB
ANION GAP SERPL CALC-SCNC: 14 MMOL/L — SIGNIFICANT CHANGE UP (ref 5–17)
BUN SERPL-MCNC: 53 MG/DL — HIGH (ref 7–23)
CALCIUM SERPL-MCNC: 9.6 MG/DL — SIGNIFICANT CHANGE UP (ref 8.4–10.5)
CHLORIDE SERPL-SCNC: 103 MMOL/L — SIGNIFICANT CHANGE UP (ref 96–108)
CO2 SERPL-SCNC: 24 MMOL/L — SIGNIFICANT CHANGE UP (ref 22–31)
CREAT SERPL-MCNC: 2.2 MG/DL — HIGH (ref 0.5–1.3)
CULTURE RESULTS: NO GROWTH — SIGNIFICANT CHANGE UP
FOLATE SERPL-MCNC: 11.7 NG/ML — SIGNIFICANT CHANGE UP (ref 4.8–24.2)
GLUCOSE SERPL-MCNC: 141 MG/DL — HIGH (ref 70–99)
HCT VFR BLD CALC: 37.3 % — LOW (ref 39–50)
HGB BLD-MCNC: 11.9 G/DL — LOW (ref 13–17)
MAGNESIUM SERPL-MCNC: 2.4 MG/DL — SIGNIFICANT CHANGE UP (ref 1.6–2.6)
MCHC RBC-ENTMCNC: 31.2 PG — SIGNIFICANT CHANGE UP (ref 27–34)
MCHC RBC-ENTMCNC: 31.9 G/DL — LOW (ref 32–36)
MCV RBC AUTO: 97.9 FL — SIGNIFICANT CHANGE UP (ref 80–100)
PLATELET # BLD AUTO: 276 K/UL — SIGNIFICANT CHANGE UP (ref 150–400)
POTASSIUM SERPL-MCNC: 4.3 MMOL/L — SIGNIFICANT CHANGE UP (ref 3.5–5.3)
POTASSIUM SERPL-SCNC: 4.3 MMOL/L — SIGNIFICANT CHANGE UP (ref 3.5–5.3)
RBC # BLD: 3.81 M/UL — LOW (ref 4.2–5.8)
RBC # FLD: 13.8 % — SIGNIFICANT CHANGE UP (ref 10.3–16.9)
SODIUM SERPL-SCNC: 141 MMOL/L — SIGNIFICANT CHANGE UP (ref 135–145)
SPECIMEN SOURCE: SIGNIFICANT CHANGE UP
VIT B12 SERPL-MCNC: 889 PG/ML — SIGNIFICANT CHANGE UP (ref 243–894)
WBC # BLD: 8.2 K/UL — SIGNIFICANT CHANGE UP (ref 3.8–10.5)
WBC # FLD AUTO: 8.2 K/UL — SIGNIFICANT CHANGE UP (ref 3.8–10.5)

## 2017-07-19 PROCEDURE — 99233 SBSQ HOSP IP/OBS HIGH 50: CPT

## 2017-07-19 RX ORDER — AMIODARONE HYDROCHLORIDE 400 MG/1
1 TABLET ORAL
Qty: 30 | Refills: 0 | OUTPATIENT
Start: 2017-07-19 | End: 2017-08-18

## 2017-07-19 RX ORDER — LISINOPRIL 2.5 MG/1
2.5 TABLET ORAL DAILY
Qty: 0 | Refills: 0 | Status: DISCONTINUED | OUTPATIENT
Start: 2017-07-19 | End: 2017-07-20

## 2017-07-19 RX ORDER — LISINOPRIL 2.5 MG/1
2.5 TABLET ORAL DAILY
Qty: 0 | Refills: 0 | Status: DISCONTINUED | OUTPATIENT
Start: 2017-07-19 | End: 2017-07-19

## 2017-07-19 RX ORDER — SODIUM CHLORIDE 9 MG/ML
250 INJECTION INTRAMUSCULAR; INTRAVENOUS; SUBCUTANEOUS
Qty: 0 | Refills: 0 | Status: DISCONTINUED | OUTPATIENT
Start: 2017-07-19 | End: 2017-07-19

## 2017-07-19 RX ADMIN — AMIODARONE HYDROCHLORIDE 200 MILLIGRAM(S): 400 TABLET ORAL at 19:12

## 2017-07-19 RX ADMIN — LISINOPRIL 2.5 MILLIGRAM(S): 2.5 TABLET ORAL at 12:06

## 2017-07-19 RX ADMIN — Medication 100 MILLIGRAM(S): at 10:39

## 2017-07-19 RX ADMIN — Medication 1 TABLET(S): at 12:06

## 2017-07-19 RX ADMIN — RIVAROXABAN 15 MILLIGRAM(S): KIT at 12:06

## 2017-07-19 RX ADMIN — Medication 10 MILLIGRAM(S): at 23:02

## 2017-07-19 RX ADMIN — Medication 0.12 MILLIGRAM(S): at 05:40

## 2017-07-19 RX ADMIN — Medication 100 MILLIGRAM(S): at 12:05

## 2017-07-19 RX ADMIN — Medication 10 MILLIGRAM(S): at 10:39

## 2017-07-19 RX ADMIN — SODIUM CHLORIDE 50 MILLILITER(S): 9 INJECTION INTRAMUSCULAR; INTRAVENOUS; SUBCUTANEOUS at 10:38

## 2017-07-19 RX ADMIN — Medication 100 MILLIGRAM(S): at 23:02

## 2017-07-19 RX ADMIN — AMIODARONE HYDROCHLORIDE 200 MILLIGRAM(S): 400 TABLET ORAL at 05:39

## 2017-07-19 RX ADMIN — Medication 1 MILLIGRAM(S): at 12:06

## 2017-07-19 NOTE — DISCHARGE NOTE ADULT - PLAN OF CARE
You were admitted to the cardiac telemetry floor for treatment of decompensated heart failure. You were given intravenous lasix to get rid of the fluid around your lungs and body. THis happened likely from discontinuation of your medications. It is very important that you DO NOT stop taking your medications to prevent symptoms and rehospitalization. Weigh yourself daily and report any weight gain over 2 pounds/day to your Doctor. The pumping function of your heart is very low. You will need to evaluated by an internal defibrillator within 3 months if it does not improve. Please follow up with Dr. Roberts. You have a history of atrial fibrillation. You heart rate was fast during your hospitalization also likely from discontinuation of your medications. Your heart rate was controlled with Digoxin, Amiodarone and Metoprolol; please take them as prescribed without missing doses. Continue to take the blood thinner xarelto to prevent the risk of stroke. Follow up with Dr. Roberts for ongoing monitoring. You were monitored closely fro alcohol withdrawal in the hospital and evaluated by Psychiatry. We recommend you to go to rehab with anticipation to stop drinking. You were found to have a gout flare in your right knee during your hospitalization. Please take Prednisone as prescribed. You have a history of chronic kidney disease which worsened slightly during your hospitalization. It is likely related to getting rid of too much fluid from your body.  Please have your kidney function checked within 1 week to make sure it is stable. Incidental finding of left fibula fracture was noted on your knee xray. You were evaluated by Orthopedics and underwent Knee MRI Follow up with Dr Sheth on 8/3/17 and Dr Roberts on 8/8/17 as scheduled You were admitted to the cardiac telemetry floor for treatment of decompensated congestive heart failure. You were given intravenous Lasix to get rid of the fluid around your lungs and body. This happened likely from discontinuation of your medications. It is very important that you DO NOT stop taking your medications to prevent symptoms and rehospitalization. Weigh yourself daily and report any weight gain over 2 pounds/day to your Doctor. The pumping function of your heart is very low. You will need to reevaluate your heart pumping function in 3 months and may need an internal defibrillator if it does not improve. Please follow up with Dr. Roberts and Dr Sheth as scheduled. You have a history of atrial fibrillation. You heart rate was fast during your hospitalization also likely from discontinuation of your medications. Your heart rate was controlled with Digoxin, Amiodarone and Metoprolol; please take them as prescribed without missing doses. Continue to take the blood thinner Xarelto to prevent the risk of stroke. Follow up with Dr. Roberts for ongoing monitoring. You were monitored closely for alcohol withdrawal in the hospital and evaluated by Psychiatry. We recommend you to go to rehab with anticipation to stop drinking. You were found to have a gout flare up in your right knee during your hospitalization. Please take Prednisone as prescribed. Incidental finding of left fibula fracture was noted on your Knee Xray. You were evaluated by Orthopedics and underwent Knee MRI which You were found to have a gout flare up in your right knee during your hospitalization. Please take Prednisone 5mg twice daily for 5 days, then decrease to 1mg daily. You may start Colchicine 0.6mg daily after 1 week on Prednisone and monitor for any recurrent diarrhea. You may stop the Prednisone if you are tolerating the Colchicine without diarrheal side effects. Please stake Uloric 40mg daily to prevent further gout attacks. You will likely need repeat Uric Acid level in 2 weeks. Incidental finding of left fibula fracture was noted on your Knee Xray. You were evaluated by Orthopedics and underwent Knee MRI which showed subacute to chronic left fibular head apex. You were seen by orthopedic surgeons and they recommended no interventions at this time. You were found to have a gout flare up in your right knee during your hospitalization. Please take Prednisone 5mg twice daily for 5 days, then decrease to 1mg daily. You may start Colchicine 0.6mg daily after 1 week on Prednisone and monitor for any recurrent diarrhea. You may stop the Prednisone if you are tolerating the Colchicine without diarrheal side effects. Please start Uloric 40mg daily to prevent further gout attacks. You will likely need repeat Uric Acid level in 2 weeks on Uloric.

## 2017-07-19 NOTE — DISCHARGE NOTE ADULT - MEDICATION SUMMARY - MEDICATIONS TO CHANGE
I will SWITCH the dose or number of times a day I take the medications listed below when I get home from the hospital:    Lasix 40 mg oral tablet  -- 1 tab(s) by mouth once a day    lisinopril 5 mg oral tablet  -- 1 tab(s) by mouth once a day

## 2017-07-19 NOTE — PROGRESS NOTE ADULT - PROBLEM SELECTOR PLAN 5
Anxiety and symptoms may be from cardiac issues vs possible ETOH withdrawal on admission.  - Currently mental status much improved a/ox 3, interactive.  - Continue ativan every 8 hours PRN

## 2017-07-19 NOTE — PROVIDER CONTACT NOTE (OTHER) - ACTION/TREATMENT ORDERED:
monitor
Dr Feng noted Psych pt suffers from Delirium, will continue to monitor
rectal temp =101.1, NP Amaury notified, UA/UC sample obtained NP/MD at bedside for blood culture and lactic acid specimen, cpap reapplied
continue to monitor

## 2017-07-19 NOTE — PROGRESS NOTE ADULT - ATTENDING COMMENTS
- pt seen and examined today, agree with np note  -more lethargic, and with painful right knee, concern for septic arthritis- ortho called  -continue metoprolol and amiodarone for rate control  -hold xarelto for now given possible need for knee surgery  -stable for knee surgery, but overal a high risk candidate given systolic CHF and CKD  -hold diuretics for now
-pt seen and examined today  -af rate controlled, lungs clear  -plan to continue rate controlling meds, amio per EPS for DCCV monday  -switch to po lasiz  -will consider adding aceI
Agree with Progress Note, Subjective and Objective, Assessment and Plan
I personally interviewed and examined pt
Agree with Progress Note, Subjective and Objective, Assessment and Plan
Pt remains awake and alert and card managed Afib overnite and discussed plan this Am. will continue Amiodarone per Card since unclear when took last and monitor LFT's. Continue Librium and heart failure management per Dr. Higuera.
Pt states SOB improved and back on lopressor and lisinopril. Card Dr. Sheth and EP Dr. Roberts to see. Pt awake and alert and not clear withdrawing at this time. EKG and Trops noted. Check LE dopplers with SOB on admission and clear CXR. Continue Librium for now.
No evidence of withdrawal and Afib rate controlled. LAKEISHA today and psych f/u. continue prn Ativan. comfortable on NC. Discuss transfer to [facility] with Dr. devine. LFT's decreasing. On Amiodarone.
Agree with Progress Note, Subjective and Objective, Assessment and Plan
Agree with Progress Note, Subjective and Objective, Assessment and Plan

## 2017-07-19 NOTE — PROGRESS NOTE ADULT - ASSESSMENT
61 year old M w/ PMH of CHF, HTN, atrial fibrillation s/p ablation, HLD, EtOH abuse presented to [facility] ED w/ medication noncompliance x1wk and SOB most likely 2/2 to acute on chronic systolic CHF exacerbation in setting of Afib with RVR and medication non-compliance s/p diuresis and now rate controlled. Hospital course complicated by gout flare and orthostatic hypotension for which he's being monitored further.     Problem/Plan - 1:  ·  Problem: Acute on chronic systolic (congestive) heart failure.  Plan: C/o SOB for few days on admission w/ LOPEZ. BNP 4033. Etiology likely Acute on chronic systolic CHF likely secondary to medication noncompliance, Afib w/ -120s.  - Clinically dry, othostatic.  Continue to hold Lasix. Will given  ml @ 50 ml/hr. Remove fluid restriction.   -Trop mildly elevated 0.04 likely in setting of acute systolic CHF exac, have trended down.  -Echo performed 7/12/17 w/ EF 15-20%, mod dilated LV, severe global hypokinesis, mild-mod MR, mild TR.  Etiology likely tachyinduced CM vs ETOH induced. Worked up in the outpatient setting per DR. Higuera.   -CXR w/ pulm vasc congestion on admission, repeat CXR improved.  -C/w Metoprolol 100mg BID . Lisinopril decreased to 2.5 mg in the setting of orthostatic hypotension.  - Outpatient evaluation of AICD in 3 months.     Problem/Plan - 2:  ·  Problem: Atrial fibrillation, unspecified type.  Plan: Hx Afib ablation, managed by EP Dr. Kristofer Roberts, colleague Dr. Higuera is seeing patient   -Currently Afib rate controlled w/ HR in 70-80's.  -S/p Amio drip, continue Amiodarone 200mg BID PO inhospital, decrease to 200mg PO daily at discharge per Dr. Higuera. LFTs elevated on admission, now improved. Cont to monitor LFTs. Patient reports amio previously not covered by insurance; however called [facility] pharmacy and they confirmed it is covered with $0 copay.  -Dig level 1.0 (7/14), c/w maintenance PO Digoxin 0.125mg daily   -C/w Metoprolol 100mg BID PO  - Continue  Xarelto 15mg PO for anticoagulation, CrCl 44  -S/p LAKEISHA performed w/ no LA clot. Initially plan for DCCV today, now deferred as patient is well rate controlled on current meds and has a history of non compliance.  -Hx sleep apnea per pt, placed on CPAP at night.     Problem/Plan - 3:  ·  Problem: Gout flare.  Plan: C/o chronic R knee pain x20yrs 2/2 trauma. Reports pain increased over past few weeks, ambulates with cane. On exam R knee swelling w/ increased warmth. Elevated WBC, sed rate, CRP. Fever 101.1. ORtho consulted for possible septic joint s/p aspiration 7/16 w/ Nucleated cell count 9400 and Uric acid crystals c/w acute R knee gout attack.   - Rheum on board (Dr. Tejada); per recs, Colchicine d/c'd in the setting of diarrhea. Started on Prednisone 10 mg BID x 3 days and taper afterwards.  - f/u 24 hour uric acid collection. Urolic 40 mg to be started today.  - Fever likely 22/ acute inflammatory process. Blood/aspirate culture/ Ucx negative.     Problem/Plan - 4:  ·  Problem: Orthostatic hypotension.  Plan: - Patient noted by orthostatic yesterday evening and this morning while OOB to chair, asymptomatic  - Etiology likely from over diuresis and medications.   - Continue to hold Lasix. Will give Ns 50 mg x 250 ml  - Decreased Lisinopril to 2.5 mg daily. Continue Metoprolol 100 mg BID for now, will decreased to 50 mg BId if continues to be orthostatic.     Problem/Plan - 5:  ·  Problem: Alcohol withdrawal.  Plan: Anxiety and symptoms may be from cardiac issues vs possible ETOH withdrawal on admission.  - Currently mental status much improved a/ox 3, interactive.  - Continue ativan every 8 hours PRN.

## 2017-07-19 NOTE — PROGRESS NOTE ADULT - PROBLEM SELECTOR PLAN 2
Hx Afib ablation, managed by EP Dr. Kristofer Roberts, colleague Dr. Higuera is seeing patient   -Currently Afib rate controlled w/ HR in 70-80's.  -S/p Amio drip, continue Amiodarone 200mg BID PO inhospital, decrease to 200mg PO daily at discharge per Dr. Higuera. LFTs elevated on admission, now improved. Cont to monitor LFTs. Patient reports amio previously not covered by insurance; will find out from pharmacy.  -Dig level 1.0 (7/14), c/w maintenance PO Digoxin 0.125mg daily   -C/w Metoprolol 100mg BID PO  - Continue  Xarelto 15mg PO for anticoagulation, CrCl 44  -S/p LAKEISHA performed w/ no LA clot. Initially plan for DCCV today, now deferred as patient is well rate controlled on current meds and has a history of non compliance.  -Hx sleep apnea per pt, placed on CPAP at night. Hx Afib ablation, managed by EP Dr. Kristofer Roberts, colleague Dr. Higuera is seeing patient   -Currently Afib rate controlled w/ HR in 70-80's.  -S/p Amio drip, continue Amiodarone 200mg BID PO inhospital, decrease to 200mg PO daily at discharge per Dr. Higuera. LFTs elevated on admission, now improved. Cont to monitor LFTs. Patient reports amio previously not covered by insurance; however called [facility] and they confirmed it is covered with $0 copay.  -Dig level 1.0 (7/14), c/w maintenance PO Digoxin 0.125mg daily   -C/w Metoprolol 100mg BID PO  - Continue  Xarelto 15mg PO for anticoagulation, CrCl 44  -S/p LAKEISHA performed w/ no LA clot. Initially plan for DCCV today, now deferred as patient is well rate controlled on current meds and has a history of non compliance.  -Hx sleep apnea per pt, placed on CPAP at night.

## 2017-07-19 NOTE — DISCHARGE NOTE ADULT - PATIENT PORTAL LINK FT
“You can access the Follow[employer] Patient Portal, offered by [facility], by registering with the following website: [email or web address]”

## 2017-07-19 NOTE — PROGRESS NOTE ADULT - SUBJECTIVE AND OBJECTIVE BOX
CARDIOLOGY NP PROGRESS NOTE    Subjective:Pt seen and examined at bedside. Denies chest pain, dizziness, palpitations or SOB.    Overnight Events: No events.     TELEMETRY: Rate controlled afib. 70-80's  EKG:      VITAL SIGNS:  T(C): 36.6 (07-19-17 @ 09:15), Max: 37.2 (07-19-17 @ 04:44)  HR: 80 (07-19-17 @ 08:53) (75 - 118)  BP: 86/63 (07-19-17 @ 08:53) (67/54 - 150/71)  RR: 16 (07-19-17 @ 08:53) (16 - 18)  SpO2: 98% (07-19-17 @ 08:53) (95% - 100%)  Wt(kg): --    I&O's Summary    18 Jul 2017 07:01  -  19 Jul 2017 07:00  --------------------------------------------------------  IN: 237 mL / OUT: 650 mL / NET: -413 mL    19 Jul 2017 07:01  -  19 Jul 2017 10:43  --------------------------------------------------------  IN: 100 mL / OUT: 0 mL / NET: 100 mL          PHYSICAL EXAM:    General: A/ox 3, No acute Distress  Neck: Supple, NO JVD  Cardiac: S1 S2, No M/R/G  Pulmonary: CTAB, Breathing unlabored, No Rhonchi/Rales/Wheezing  Abdomen: Soft, Non -tender, +BS x 4 quads  Extremities: No Rashes, No edema  Neuro: A/o x 3, No focal deficits          LABS:                          11.9   8.2   )-----------( 276      ( 19 Jul 2017 06:36 )             37.3                              07-19    141  |  103  |  53<H>  ----------------------------<  141<H>  4.3   |  24  |  2.20<H>    Ca    9.6      19 Jul 2017 06:36  Mg     2.4     07-19                                CAPILLARY BLOOD GLUCOSE                  No Known Allergies    MEDICATIONS  (STANDING):  metoprolol 100 milliGRAM(s) Oral two times a day  amiodarone    Tablet 200 milliGRAM(s) Oral two times a day  digoxin     Tablet 0.125 milliGRAM(s) Oral daily  rivaroxaban 15 milliGRAM(s) Oral daily  folic acid 1 milliGRAM(s) Oral daily  thiamine 100 milliGRAM(s) Oral daily  multivitamin 1 Tablet(s) Oral daily  predniSONE   Tablet 10 milliGRAM(s) Oral every 12 hours  sodium chloride 0.9%. 250 milliLiter(s) (50 mL/Hr) IV Continuous <Continuous>  lisinopril 2.5 milliGRAM(s) Oral daily    MEDICATIONS  (PRN):  LORazepam     Tablet 2 milliGRAM(s) Oral every 8 hours PRN Agitation        DIAGNOSTIC TESTS:

## 2017-07-19 NOTE — PROGRESS NOTE ADULT - SUBJECTIVE AND OBJECTIVE BOX
Neurology Follow up note    Name  DOUGLAS FAITH    HPI:  61 year old M w/ PMH of CHF, HTN, atrial fibrillation s/p ablation, HLD, EtOH abuse presented to [facility] ED w/ SOB. Patient states that he ran out of his medications this past Sunday and started to feel short of breath last night. He complains of worsening dyspnea on exertion, stating that he used to be able to walk 20-30 yards before becoming SOB and not tires out much sooner. He had a similar episode of SOB in February for which he was seen at [facility] and diagnosed with a heart failure exacerbation. He also has been drinking a pint of vodka for the last 4 years daily. His last drink was 8PM Monday evening. He denies a history of alcohol withdrawal seizures and is unsure if he has ever been intubated. He currently denies any CP, nausea, vomiting, abd pain, headache, or hallucinations, but states that he feels very anxious.     In the ED, Tmax 99,  - 143 (sinus) 107, /77 - 189/145, RR 18 - 22, Sat 97% - 100%. In the ED, he was given 4 mg Zofran, Lopressor 5 mg IVP, , Mag 2 gm, Lasix 40 mg, Aivan 2 mg x3, and 1L NS banana bag x2, Librium 50mg and Valium 10mg. (12 Jul 2017 02:11)      Interval History - more alert- Knee pain continues- R./O Fx        REVIEW OF SYSTEMS    Vital Signs Last 24 Hrs  T(C): 37.2 (19 Jul 2017 04:44), Max: 37.2 (19 Jul 2017 04:44)  T(F): 98.9 (19 Jul 2017 04:44), Max: 98.9 (19 Jul 2017 04:44)  HR: 75 (19 Jul 2017 05:20) (75 - 118)  BP: 136/87 (19 Jul 2017 05:11) (67/54 - 150/71)  BP(mean): 95 (19 Jul 2017 05:11) (62 - 95)  RR: 16 (19 Jul 2017 05:20) (16 - 18)  SpO2: 98% (19 Jul 2017 05:20) (95% - 100%)    Physical Exam-     Mental Status- awake and alert    Cranial Nerves- no diplopia    Gait and station-unsteady    Motor- no foot drop    Reflexes- decreased    Sensation- distal sensory loss    Coordination-no tremors    Vascular -    Medications  metoprolol 100 milliGRAM(s) Oral two times a day  amiodarone    Tablet 200 milliGRAM(s) Oral two times a day  digoxin     Tablet 0.125 milliGRAM(s) Oral daily  lisinopril 10 milliGRAM(s) Oral daily  LORazepam     Tablet 2 milliGRAM(s) Oral every 8 hours PRN  rivaroxaban 15 milliGRAM(s) Oral daily  folic acid 1 milliGRAM(s) Oral daily  thiamine 100 milliGRAM(s) Oral daily  multivitamin 1 Tablet(s) Oral daily  predniSONE   Tablet 10 milliGRAM(s) Oral every 12 hours      Lab      Radiology    Assessment- No further neuro w/o- defer to Ortho- Rehab    Plan

## 2017-07-19 NOTE — DISCHARGE NOTE ADULT - ADDITIONAL INSTRUCTIONS
1.  Follow up with DR. Das on   [address], 9 [facility]  [phone]    2.  Follow up with Dr. Roberts on       3.  Follow up with Dr. Tejada on 1.  Follow up with Dr. Sheth on  August 3rd at 2 pm.   [address]    [phone]      2.  Follow up with Dr. Roberts on       3.  Follow up with Dr. Tejada on 1.  Follow up with Dr. Sheth on  August 3rd at 2 pm.   [address]    [phone]      2.  Follow up with Dr. Roberts on  August 8th at 1;30 pm  [address]   [phone]    3.  Follow up with Dr. Tejada on 1.  Follow up with Dr. Sheth on  August 3rd, 2017 at 2 pm.   [address]    [phone]      2.  Follow up with Dr. Roberts on  August 8th, 2017 at 1;30 pm  [address]   [phone]

## 2017-07-19 NOTE — PROGRESS NOTE ADULT - ASSESSMENT
61 year old M w/ PMH of CHF, HTN, atrial fibrillation s/p ablation, HLD, EtOH abuse presented to [facility] ED w/ medication noncompliance x1wk and SOB most likely 2/2 to acute on chronic systolic CHF exacerbation in setting of Afib with RVR and medication non-compliance s/p diuresis and now rate controlled. Hospital course complicated by gout flare and orthostatic hypotension for which he's being monitored further.

## 2017-07-19 NOTE — DISCHARGE NOTE ADULT - SECONDARY DIAGNOSIS.
Atrial fibrillation, unspecified type Alcohol withdrawal Gout attack CKD (chronic kidney disease) Fibula fracture

## 2017-07-19 NOTE — DISCHARGE NOTE ADULT - HOSPITAL COURSE
61 year old M w/ PMH of CHF, HTN, atrial fibrillation s/p ablation, HLD, ETOH abuse presented to [facility] ED w/ SOB. Patient states that he ran out of his medications this past Sunday and started to feel SOB the night before admission. Treated before at outside hospitals for similar events. He also has been drinking a pint of vodka for the last 4 years daily. His last drink was 7/10 at 8pm. He denies a history of alcohol withdrawal seizures.  Hospital course was complicated by A-flutter and then A-fib with RVR with HR up to 150, which was treated with multiple medications and finally controlled with metoprolol, digoxin, and amiodarone. Patient is still in A-fib, but heart rate has been fairly well controlled (). There was a concern patient was in alcohol withdrawal upon admission because patient was very agitated and diaphoretic, but consensus is that most symptoms probably stemming from CHF exacerbation. Patient also has anxiety and other psychiatric issues and is followed by psychiatry who recommended ativan 2mg q6h. Patient also has social issues and is concerned about being homeless. He is transferred from [facility] to telemetry [facility] for further management of acute on chronic systolic CHF exacerbation in setting of Afib with RVR.    On 5[facility], patient was started on IV diuresis with improvement. Rate control was achieved with Amiodarone, Digoxin and Metoprolol. DCCV was deferred 2/2 patient hx of non compliance and increased risk of stroke if patient was to stop AC post DCCV. Hospital course complicated by fever/right knee efffusion s/p drainage 2/2 concern for septic joint which was + uric crystals consistent with acute gout flare.  He was started on Colchicine initially and later transitioned to Prednisone per rheum recs in the setting of diarrhea. Lasix was d/c'd for a brief period 2/2 worsening renal function and positive orthostatics. Incidental finding on Knee xray of left proximal fibula.  MRi Knee done to assess for acuity of the fracture 61 year old M w/ PMH of CHF, HTN, atrial fibrillation s/p ablation, HLD, ETOH abuse presented to [facility] ED w/ SOB. Patient states that he ran out of his medications this past Sunday and started to feel SOB the night before admission. Treated before at outside hospitals for similar events. He also has been drinking a pint of vodka for the last 4 years daily. His last drink was 7/10 at 8pm. He denies a history of alcohol withdrawal seizures.  Hospital course was complicated by A-flutter and then A-fib with RVR with HR up to 150, which was treated with multiple medications and finally controlled with metoprolol, digoxin, and amiodarone. Patient is still in A-fib, but heart rate has been fairly well controlled (). There was a concern patient was in alcohol withdrawal upon admission because patient was very agitated and diaphoretic, but consensus is that most symptoms probably stemming from CHF exacerbation. Patient also has anxiety and other psychiatric issues and is followed by psychiatry who recommended ativan 2mg q6h. Patient also has social issues and is concerned about being homeless. He is transferred from [facility] to telemetry 5 [facility] for further management of acute on chronic systolic CHF exacerbation in setting of Afib with RVR.    On 5-[facility], patient was started on IV diuresis with improvement. Rate control was achieved with Amiodarone, Digoxin and Metoprolol. DCCV was deferred 2/2 patient hx of non compliance and increased risk of stroke if patient was to stop AC post DCCV. Hospital course complicated by fever/right knee efffusion s/p drainage 2/2 concern for septic joint which was + uric crystals consistent with acute gout flare.  He was started on Colchicine initially and later transitioned to Prednisone w/ taper per rheum recs in the setting of diarrhea. Lasix was d/c'd for a brief period 2/2 worsening renal function and positive orthostatics. Incidental finding on Knee xray of left proximal fibula. MRI Knee done to assess for acuity of the fracture and showed subacute to chronic fracture of the left fibular head apex. Ortho rec no intervention at this time. He is discharged to [facility] hemodynamically stable with follow up w/ cardiologist and EP.

## 2017-07-19 NOTE — PROGRESS NOTE ADULT - SUBJECTIVE AND OBJECTIVE BOX
No complaints  Going to rehab - not sure of long term placement  Denies CP, SOB, palpitations      MEDICATIONS:  metoprolol 100 milliGRAM(s) Oral two times a day  amiodarone    Tablet 200 milliGRAM(s) Oral two times a day  digoxin     Tablet 0.125 milliGRAM(s) Oral daily  lisinopril 2.5 milliGRAM(s) Oral daily  LORazepam     Tablet 2 milliGRAM(s) Oral every 8 hours PRN  predniSONE   Tablet 10 milliGRAM(s) Oral every 12 hours    rivaroxaban 15 milliGRAM(s) Oral daily  folic acid 1 milliGRAM(s) Oral daily  thiamine 100 milliGRAM(s) Oral daily  multivitamin 1 Tablet(s) Oral daily  sodium chloride 0.9%. 250 milliLiter(s) IV Continuous <Continuous>      [PHYSICAL EXAM:  TELEMETRY:  T(C): 37.1 (07-19-17 @ 13:05), Max: 37.2 (07-19-17 @ 04:44)  HR: 74 (07-19-17 @ 14:01) (74 - 86)  BP: 139/67 (07-19-17 @ 14:01) (86/63 - 150/71)  RR: 16 (07-19-17 @ 14:01) (16 - 18)  SpO2: 98% (07-19-17 @ 14:01) (95% - 98%)  Wt(kg): --  I&O's Summary    18 Jul 2017 07:01  -  19 Jul 2017 07:00  --------------------------------------------------------  IN: 237 mL / OUT: 650 mL / NET: -413 mL    19 Jul 2017 07:01  -  19 Jul 2017 18:15  --------------------------------------------------------  IN: 320 mL / OUT: 0 mL / NET: 320 mL        [place]:  Central/PICC/Mid Line:                                         Appearance: Normal	  HEENT:   Normal oral mucosa, PERRL, EOMI	  Neck: Supple, + JVD/ - JVD; Carotid Bruit   Cardiovascular: Normal S1 S2, No JVD, No murmurs,   Respiratory: Lungs clear to auscultation/Decreased Breath Sounds/No Rales, Rhonchi, Wheezing	  Gastrointestinal:  Soft, Non-tender, + BS	  Skin: No rashes, No ecchymoses, No cyanosis  Extremities: Normal range of motion, No clubbing, cyanosis or edema  Vascular: Peripheral pulses palpable 2+ bilaterally  Neurologic: Non-focal  Psychiatry: A & O x 3, Mood & affect appropriate      	    ECG:  AF with controlled VR	  RADIOLOGY:   DIAGNOSTIC TESTING:  [ ] Echocardiogram:  [ ]  Catheterization:  [ ] Stress Test:    [ ] LAKEISHA  OTHER: 	    LABS:	 	  CARDIAC MARKERS:                                  11.9   8.2   )-----------( 276      ( 19 Jul 2017 06:36 )             37.3     07-19    141  |  103  |  53<H>  ----------------------------<  141<H>  4.3   |  24  |  2.20<H>    Ca    9.6      19 Jul 2017 06:36  Mg     2.4     07-19      proBNP:   Lipid Profile:   HgA1c:   TSH:       ASSESSMENT/PLAN: 	    AF with better controlled rates  - on anticoagulation  - BB and Amio for rate control  - Elective cardioversion with Dr. Roberts planned    CM / CHF  - CHF regimen being optimized  - abstinence from alcohol  - outpatient reevaluation (after Rx of CHF) for ICD implantation    Social issues being addressed    Follow Up:  - EP: Dr. Roberts  - Cardio/CHF - Dr. Bernal    Follow up

## 2017-07-19 NOTE — PROGRESS NOTE ADULT - PROBLEM SELECTOR PLAN 1
C/o SOB for few days on admission w/ [facility]OPE[facility]. BNP 4033. Etiology likely Acute on chronic systolic CHF likely secondary to medication noncompliance, Afib w/ -120s.  - Clinically dry, othostatic.  Continue to hold Lasix. Will given  ml @ 50 ml/hr. Remove fluid restriction.   -Trop mildly elevated 0.04 likely in setting of acute systolic CHF exac, have trended down.  -Echo performed 7/12/17 w/ EF 15-20%, mod dilated LV, severe global hypokinesis, mild-mod MR, mild TR.  Etiology likely tachyinduced CM vs ETOH induced. Worked up in the outpatient setting per DR. Higuera.   -CXR w/ pulm vasc congestion on admission, repeat CXR improved.  -C/w Metoprolol 100mg BID . Lisinopril decreased to 2.5 mg in the setting of orthostatic hypotension.  - Outpatient evaluation of AICD in 3 months.

## 2017-07-19 NOTE — PROGRESS NOTE ADULT - PROBLEM SELECTOR PLAN 4
- Patient noted by orthostatic yesterday evening and this morning while OOB to chair, asymptomatic  - Etiology likely from over diuresis and medications.   - Continue to hold Lasix. Will give Ns 50 mg x 250 ml  - Decreased Lisinopril to 2.5 mg daily. Continue Metoprolol 100 mg BID for now, will decreased to 50 mg BId if continues to be orthostatic.

## 2017-07-19 NOTE — PROGRESS NOTE ADULT - SUBJECTIVE AND OBJECTIVE BOX
HISTORY OF PRESENT ILLNESS:     Pt is a 62 y/o m with multiple cardiac,renal problems that makes treatment for his polyarthritis very difficult . He has multiple addmissions for congestive heart failure which usually starts when he is drinking or runs out of his medication. He doesn't always stay in a protictive enviorment.  He now has gouty arthritis with fluid aspirated from his Rt knee show crystals of Urate. He still has swelling on colchicine and active arthritic pain. His condition is also complicated by secondary diarrhea to the colchicine    PAST MEDICAL & SURGICAL HISTORY:  HLD (hyperlipidemia)  Atrial fibrillation, unspecified type  Congestive heart failure: CHF (congestive heart failure)  Essential hypertension: HTN (hypertension)  No significant past surgical history      REVIEW OF SYSTEMS      General:  confused  with poor memory	    Skin/Breast:  	  Ophthalmologic:  	  ENMT:	    Respiratory and Thorax:  	  Cardiovascular:	    Gastrointestinal:	    Genitourinary:	    Musculoskeletal:	    Neurological:	    Psychiatric:	    Hematology/Lymphatics:	    Endocrine:	    Allergic/Immunologic:	    MEDICATIONS  (STANDING):  metoprolol 100 milliGRAM(s) Oral two times a day  amiodarone    Tablet 200 milliGRAM(s) Oral two times a day  digoxin     Tablet 0.125 milliGRAM(s) Oral daily  lisinopril 10 milliGRAM(s) Oral daily  colchicine 0.6 milliGRAM(s) Oral daily  rivaroxaban 15 milliGRAM(s) Oral daily  folic acid 1 milliGRAM(s) Oral daily  thiamine 100 milliGRAM(s) Oral daily  multivitamin 1 Tablet(s) Oral daily    MEDICATIONS  (PRN):  LORazepam     Tablet 2 milliGRAM(s) Oral every 8 hours PRN Agitation      Allergies    No Known Allergies    Intolerances        PERTINENT MEDICATION HISTORY:    SOCIAL HISTORY:    FAMILY HISTORY:  No pertinent family history in first degree relatives    ICU Vital Signs Last 24 Hrs  T(C): 37.2 (2017 04:44), Max: 37.2 (2017 04:44)  T(F): 98.9 (2017 04:44), Max: 98.9 (2017 04:44)  HR: 75 (2017 05:20) (75 - 118)  BP: 136/87 (2017 05:11) (67/54 - 150/71)  BP(mean): 95 (2017 05:11) (62 - 95)  ABP: --  ABP(mean): --  RR: 16 (2017 05:20) (16 - 18)  SpO2: 98% (2017 05:20) (95% - 100    I&O's Summary    2017 07:01  -  2017 07:00  --------------------------------------------------------  IN: 237 mL / OUT: 650 mL / NET: -413 mL        PHYSICAL EXAM:      Constitutional:    Eyes:    ENMT:    Neck:    Breasts:    Back:    Respiratory:    Cardiovascular: atrial fib    Gastrointestinal:    Genitourinary:    Rectal:    Extremities: active  synovitis  and RT knee effusion                     Lt ankle  synovitis    Vascular:    Neurological:    Skin:    Lymph Nodes:    Musculoskeletal:    Psychiatric:        LABS:                        11.9   8.2   )-----------( 276      ( 2017 06:36 )             37.3     07-19    141  |  103  |  53<H>  ----------------------------<  141<H>  4.3   |  24  |  2.20<H>    Ca    9.6      2017 06:36  Mg     2.4             Color: Yellow / Appearance: Clear / S.020 / pH: x  Gluc: x / Ketone: NEGATIVE  / Bili: NEGATIVE / Urobili: 1.0 E.U./dL   Blood: x / Protein: Trace mg/dL / Nitrite: NEGATIVE   Leuk Esterase: NEGATIVE / RBC: > 10 /HPF / WBC < 5 /HPF   Sq Epi: x / Non Sq Epi: Rare /HPF / Bacteria: Present /HPF        RADIOLOGY & ADDITIONAL STUDIES: HISTORY OF PRESENT ILLNESS:     Pt is a 62 y/o m with multiple cardiac,renal problems that makes treatment for his polyarthritis very difficult . He has multiple addmissions for congestive heart failure which usually starts when he is drinking or runs out of his medication. He doesn't always stay in a protictive enviorment.  He now has gouty arthritis with fluid aspirated from his Rt knee show crystals of Urate. He still has swelling on colchicine and active arthritic pain. His condition is also complicated by secondary diarrhea to the colchicine    PAST MEDICAL & SURGICAL HISTORY:  HLD (hyperlipidemia)  Atrial fibrillation, unspecified type  Congestive heart failure: CHF (congestive heart failure)  Essential hypertension: HTN (hypertension)  No significant past surgical history      REVIEW OF SYSTEMS      General:  confused  with poor memory	    Skin/Breast:  	  Ophthalmologic:  	  ENMT:	    Respiratory and Thorax:  	  Cardiovascular:	    Gastrointestinal:	    Genitourinary:	    Musculoskeletal:	    Neurological:	    Psychiatric:	    Hematology/Lymphatics:	    Endocrine:	    Allergic/Immunologic:	    MEDICATIONS  (STANDING):  metoprolol 100 milliGRAM(s) Oral two times a day  amiodarone    Tablet 200 milliGRAM(s) Oral two times a day  digoxin     Tablet 0.125 milliGRAM(s) Oral daily  lisinopril 10 milliGRAM(s) Oral daily  colchicine 0.6 milliGRAM(s) Oral daily  rivaroxaban 15 milliGRAM(s) Oral daily  folic acid 1 milliGRAM(s) Oral daily  thiamine 100 milliGRAM(s) Oral daily  multivitamin 1 Tablet(s) Oral daily    MEDICATIONS  (PRN):  LORazepam     Tablet 2 milliGRAM(s) Oral every 8 hours PRN Agitation      Allergies    No Known Allergies    Intolerances        PERTINENT MEDICATION HISTORY:    SOCIAL HISTORY:    FAMILY HISTORY:  No pertinent family history in first degree relatives    ICU Vital Signs Last 24 Hrs  T(C): 37.2 (2017 04:44), Max: 37.2 (2017 04:44)  T(F): 98.9 (2017 04:44), Max: 98.9 (2017 04:44)  HR: 75 (2017 05:20) (75 - 118)  BP: 136/87 (2017 05:11) (67/54 - 150/71)  BP(mean): 95 (2017 05:11) (62 - 95)  ABP: --  ABP(mean): --  RR: 16 (2017 05:20) (16 - 18)  SpO2: 98% (2017 05:20) (95% - 100    I&O's Summary    2017 07:01  -  2017 07:00  --------------------------------------------------------  IN: 237 mL / OUT: 650 mL / NET: -413 mL        PHYSICAL EXAM:      Constitutional:    Eyes:    ENMT:    Neck:    Breasts:    Back:    Respiratory:    Cardiovascular: atrial fib controlled rate    Gastrointestinal:    Genitourinary:    Rectal:    Extremities: active  synovitis  and RT knee effusion                      old fracture  and quadraceps tear  exact date not remembered by patient                     Lt ankle  synovitis    Vascular:    Neurological:  confused    Skin:    Lymph Nodes:    Musculoskeletal:    Psychiatric:        LABS:                        11.9   8.2   )-----------( 276      ( 2017 06:36 )             37.3     07-19    141  |  103  |  53<H>  ----------------------------<  141<H>  4.3   |  24  |  2.20<H>    Ca    9.6      2017 06:36  Mg     2.4             Color: Yellow / Appearance: Clear / S.020 / pH: x  Gluc: x / Ketone: NEGATIVE  / Bili: NEGATIVE / Urobili: 1.0 E.U./dL   Blood: x / Protein: Trace mg/dL / Nitrite: NEGATIVE   Leuk Esterase: NEGATIVE / RBC: > 10 /HPF / WBC < 5 /HPF   Sq Epi: x / Non Sq Epi: Rare /HPF / Bacteria: Present /HPF        RADIOLOGY & ADDITIONAL STUDIES:

## 2017-07-19 NOTE — DISCHARGE NOTE ADULT - MEDICATION SUMMARY - MEDICATIONS TO TAKE
I will START or STAY ON the medications listed below when I get home from the hospital:    predniSONE 5 mg oral tablet  -- 1 tab(s) by mouth 2 times a day for 5 days, then decrease to 1mg by mouth daily.  -- Indication: For Gout attack    lisinopril 2.5 mg oral tablet  -- 1 tab(s) by mouth once a day  -- Indication: For Acute on chronic systolic (congestive) heart failure/Hypertension    digoxin 125 mcg (0.125 mg) oral tablet  -- 1 tab(s) by mouth once a day  -- Indication: For Atrial fibrillation    amiodarone 200 mg oral tablet  -- 1 tab(s) by mouth once a day  -- Avoid grapefruit and grapefruit juice while taking this medication.  Avoid prolonged or excessive exposure to direct and/or artificial sunlight while taking this medication.  Do not take this drug if you are pregnant.  It is very important that you take or use this exactly as directed.  Do not skip doses or discontinue unless directed by your doctor.  May cause drowsiness or dizziness.    -- Indication: For Atrial fibrillation    rivaroxaban 15 mg oral tablet  -- 1 tab(s) by mouth once a day  -- Indication: For Atrial fibrillation    febuxostat 40 mg oral tablet  -- 1 tab(s) by mouth once a day  -- Check with your doctor before becoming pregnant.  Obtain medical advice before taking any non-prescription drugs as some may affect the action of this medication.    -- Indication: For Gout attack    metoprolol tartrate 100 mg oral tablet  -- 1 tab(s) by mouth 2 times a day  -- Indication: For Atrial fibrillation    furosemide 20 mg oral tablet  -- 1 tab(s) by mouth once a day  -- Indication: For Acute on chronic systolic (congestive) heart failure    Multiple Vitamins oral tablet  -- 1 tab(s) by mouth once a day  -- Indication: For Supplement    folic acid 1 mg oral tablet  -- 1 tab(s) by mouth once a day  -- Indication: For Supplement    thiamine 100 mg oral tablet  -- 1 tab(s) by mouth once a day  -- Indication: For Supplement

## 2017-07-19 NOTE — PROGRESS NOTE ADULT - PROBLEM SELECTOR PLAN 6
SBP 70's 120's  - Lisinopril decreased to 2.5 mg in the setting of orthostatic hypotension. Continue Metoprolol for now to prevent afib with RVR.

## 2017-07-19 NOTE — PROGRESS NOTE ADULT - PROBLEM SELECTOR PLAN 9
Discharge pending clinical progress    PT consult: Recommend KP  Social work consult: Alcohol rehab, concerns about losing his residence 2/2 missing rent

## 2017-07-19 NOTE — DISCHARGE NOTE ADULT - CARE PROVIDERS DIRECT ADDRESSES
,[email or web address]

## 2017-07-19 NOTE — PROGRESS NOTE ADULT - SUBJECTIVE AND OBJECTIVE BOX
Physical Medicine and Rehabilitation Progress Note:    Patient is a 61y old  Male who presents with a chief complaint of Shortness of Breath (19 Jul 2017 08:10)      HPI:  61 year old M w/ PMH of CHF, HTN, atrial fibrillation s/p ablation, HLD, EtOH abuse presented to [facility] ED w/ SOB. Patient states that he ran out of his medications this past Sunday and started to feel short of breath last night. He complains of worsening dyspnea on exertion, stating that he used to be able to walk 20-30 yards before becoming SOB and not tires out much sooner. He had a similar episode of SOB in February for which he was seen at [facility] and diagnosed with a heart failure exacerbation. He also has been drinking a pint of vodka for the last 4 years daily. His last drink was 8PM Monday evening. He denies a history of alcohol withdrawal seizures and is unsure if he has ever been intubated. He currently denies any CP, nausea, vomiting, abd pain, headache, or hallucinations, but states that he feels very anxious.     In the ED, Tmax 99,  - 143 (sinus) 107, /77 - 189/145, RR 18 - 22, Sat 97% - 100%. In the ED, he was given 4 mg Zofran, Lopressor 5 mg IVP, , Mag 2 gm, Lasix 40 mg, Aivan 2 mg x3, and 1L NS banana bag x2, Librium 50mg and Valium 10mg. (12 Jul 2017 02:11)                            11.9   8.2   )-----------( 276      ( 19 Jul 2017 06:36 )             37.3       07-19    141  |  103  |  53<H>  ----------------------------<  141<H>  4.3   |  24  |  2.20<H>    Ca    9.6      19 Jul 2017 06:36  Mg     2.4     07-19      Vital Signs Last 24 Hrs  T(C): 37.1 (19 Jul 2017 13:05), Max: 37.2 (19 Jul 2017 04:44)  T(F): 98.7 (19 Jul 2017 13:05), Max: 98.9 (19 Jul 2017 04:44)  HR: 80 (19 Jul 2017 08:53) (75 - 118)  BP: 86/63 (19 Jul 2017 08:53) (67/54 - 150/71)  BP(mean): 71 (19 Jul 2017 08:53) (62 - 95)  RR: 16 (19 Jul 2017 08:53) (16 - 18)  SpO2: 98% (19 Jul 2017 08:53) (95% - 100%)    MEDICATIONS  (STANDING):  metoprolol 100 milliGRAM(s) Oral two times a day  amiodarone    Tablet 200 milliGRAM(s) Oral two times a day  digoxin     Tablet 0.125 milliGRAM(s) Oral daily  rivaroxaban 15 milliGRAM(s) Oral daily  folic acid 1 milliGRAM(s) Oral daily  thiamine 100 milliGRAM(s) Oral daily  multivitamin 1 Tablet(s) Oral daily  predniSONE   Tablet 10 milliGRAM(s) Oral every 12 hours  sodium chloride 0.9%. 250 milliLiter(s) (50 mL/Hr) IV Continuous <Continuous>  lisinopril 2.5 milliGRAM(s) Oral daily    MEDICATIONS  (PRN):  LORazepam     Tablet 2 milliGRAM(s) Oral every 8 hours PRN Agitation    Currently Undergoing Physical Therapy at bedside.    Initial Functional Status Assessment:    Bed Mobility Training:                                                                                                                                                  - Rolling/Turning:       independent    - Scooting:                                                                                         - Sit to Supine:          1 person minimum assistance                                                                        - Supine to Sit:          1 person minimum assistance    Transfer Training:  - Sit to Stand:            2 person moderate assistance                                                                          - Stand to Sit:            2 person minimum assistance    Gait Training:             took 5 side steps x 2 with a rolling walker and 2 person moderate assistance      PM&R Impression: as above    Disposition Plan Recommendations: subacute rehab placement

## 2017-07-19 NOTE — PROGRESS NOTE ADULT - PROBLEM SELECTOR PLAN 3
C/o chronic R knee pain x20yrs 2/2 trauma. Reports pain increased over past few weeks, ambulates with cane. On exam R knee swelling w/ increased warmth. Elevated WBC, sed rate, CRP. Fever 101.1. ORtho consulted for possible septic joint s/p aspiration 7/16 w/ Nucleated cell count 9400 and Uric acid crystals c/w acute R knee gout attack.   - Rheum on board (Dr. Tejada); per recs, Colchicine d/c'd in the setting of diarrhea. Started on Prednisone 10 mg BID x 3 days and taper afterwards.  - f/u 24 hour uric acid collection. Urolic 40 mg to be started today.  - Fever likely 22/ acute inflammatory process. Blood/aspirate culture/ Ucx negative.

## 2017-07-19 NOTE — DISCHARGE NOTE ADULT - COMMUNITY RESOURCES
Discharge to: [facility]. [address] [place] [address] [phone]

## 2017-07-20 VITALS — OXYGEN SATURATION: 94 %

## 2017-07-20 LAB
ANION GAP SERPL CALC-SCNC: 13 MMOL/L — SIGNIFICANT CHANGE UP (ref 5–17)
BUN SERPL-MCNC: 53 MG/DL — HIGH (ref 7–23)
CALCIUM SERPL-MCNC: 9.3 MG/DL — SIGNIFICANT CHANGE UP (ref 8.4–10.5)
CHLORIDE SERPL-SCNC: 105 MMOL/L — SIGNIFICANT CHANGE UP (ref 96–108)
CO2 SERPL-SCNC: 21 MMOL/L — LOW (ref 22–31)
COLLECT DURATION TIME UR: 24 HR — SIGNIFICANT CHANGE UP
CREAT SERPL-MCNC: 1.8 MG/DL — HIGH (ref 0.5–1.3)
GLUCOSE SERPL-MCNC: 130 MG/DL — HIGH (ref 70–99)
HCT VFR BLD CALC: 33.4 % — LOW (ref 39–50)
HGB BLD-MCNC: 10.8 G/DL — LOW (ref 13–17)
MAGNESIUM SERPL-MCNC: 2.3 MG/DL — SIGNIFICANT CHANGE UP (ref 1.6–2.6)
MCHC RBC-ENTMCNC: 30.6 PG — SIGNIFICANT CHANGE UP (ref 27–34)
MCHC RBC-ENTMCNC: 32.3 G/DL — SIGNIFICANT CHANGE UP (ref 32–36)
MCV RBC AUTO: 94.6 FL — SIGNIFICANT CHANGE UP (ref 80–100)
PLATELET # BLD AUTO: 280 K/UL — SIGNIFICANT CHANGE UP (ref 150–400)
POTASSIUM SERPL-MCNC: 3.8 MMOL/L — SIGNIFICANT CHANGE UP (ref 3.5–5.3)
POTASSIUM SERPL-SCNC: 3.8 MMOL/L — SIGNIFICANT CHANGE UP (ref 3.5–5.3)
RBC # BLD: 3.53 M/UL — LOW (ref 4.2–5.8)
RBC # FLD: 14.3 % — SIGNIFICANT CHANGE UP (ref 10.3–16.9)
SODIUM SERPL-SCNC: 139 MMOL/L — SIGNIFICANT CHANGE UP (ref 135–145)
TOTAL VOLUME - 24 HOUR: 500 ML — SIGNIFICANT CHANGE UP
URATE 24H UR-MRATE: 164 MG/24HR — SIGNIFICANT CHANGE UP (ref 250–750)
URINE CREATININE CALCULATION: 0.8 G/24 H — LOW (ref 1–2)
WBC # BLD: 6.4 K/UL — SIGNIFICANT CHANGE UP (ref 3.8–10.5)
WBC # FLD AUTO: 6.4 K/UL — SIGNIFICANT CHANGE UP (ref 3.8–10.5)

## 2017-07-20 PROCEDURE — 92610 EVALUATE SWALLOWING FUNCTION: CPT | Mod: GN

## 2017-07-20 PROCEDURE — 93970 EXTREMITY STUDY: CPT

## 2017-07-20 PROCEDURE — 81001 URINALYSIS AUTO W/SCOPE: CPT

## 2017-07-20 PROCEDURE — 82553 CREATINE MB FRACTION: CPT

## 2017-07-20 PROCEDURE — 97530 THERAPEUTIC ACTIVITIES: CPT

## 2017-07-20 PROCEDURE — 86140 C-REACTIVE PROTEIN: CPT

## 2017-07-20 PROCEDURE — 87205 SMEAR GRAM STAIN: CPT

## 2017-07-20 PROCEDURE — 82803 BLOOD GASES ANY COMBINATION: CPT

## 2017-07-20 PROCEDURE — 96374 THER/PROPH/DIAG INJ IV PUSH: CPT

## 2017-07-20 PROCEDURE — 83735 ASSAY OF MAGNESIUM: CPT

## 2017-07-20 PROCEDURE — 83880 ASSAY OF NATRIURETIC PEPTIDE: CPT

## 2017-07-20 PROCEDURE — 99233 SBSQ HOSP IP/OBS HIGH 50: CPT

## 2017-07-20 PROCEDURE — 85610 PROTHROMBIN TIME: CPT

## 2017-07-20 PROCEDURE — 87633 RESP VIRUS 12-25 TARGETS: CPT

## 2017-07-20 PROCEDURE — 87581 M.PNEUMON DNA AMP PROBE: CPT

## 2017-07-20 PROCEDURE — 84100 ASSAY OF PHOSPHORUS: CPT

## 2017-07-20 PROCEDURE — 83605 ASSAY OF LACTIC ACID: CPT

## 2017-07-20 PROCEDURE — 93312 ECHO TRANSESOPHAGEAL: CPT

## 2017-07-20 PROCEDURE — 80053 COMPREHEN METABOLIC PANEL: CPT

## 2017-07-20 PROCEDURE — 85027 COMPLETE CBC AUTOMATED: CPT

## 2017-07-20 PROCEDURE — 84560 ASSAY OF URINE/URIC ACID: CPT

## 2017-07-20 PROCEDURE — 76700 US EXAM ABDOM COMPLETE: CPT

## 2017-07-20 PROCEDURE — 87798 DETECT AGENT NOS DNA AMP: CPT

## 2017-07-20 PROCEDURE — 93005 ELECTROCARDIOGRAM TRACING: CPT

## 2017-07-20 PROCEDURE — 97162 PT EVAL MOD COMPLEX 30 MIN: CPT

## 2017-07-20 PROCEDURE — C8929: CPT

## 2017-07-20 PROCEDURE — 71045 X-RAY EXAM CHEST 1 VIEW: CPT

## 2017-07-20 PROCEDURE — 85730 THROMBOPLASTIN TIME PARTIAL: CPT

## 2017-07-20 PROCEDURE — 87486 CHLMYD PNEUM DNA AMP PROBE: CPT

## 2017-07-20 PROCEDURE — 73562 X-RAY EXAM OF KNEE 3: CPT

## 2017-07-20 PROCEDURE — 97116 GAIT TRAINING THERAPY: CPT

## 2017-07-20 PROCEDURE — 80048 BASIC METABOLIC PNL TOTAL CA: CPT

## 2017-07-20 PROCEDURE — 96376 TX/PRO/DX INJ SAME DRUG ADON: CPT

## 2017-07-20 PROCEDURE — 73721 MRI JNT OF LWR EXTRE W/O DYE: CPT

## 2017-07-20 PROCEDURE — 36415 COLL VENOUS BLD VENIPUNCTURE: CPT

## 2017-07-20 PROCEDURE — 84484 ASSAY OF TROPONIN QUANT: CPT

## 2017-07-20 PROCEDURE — 82330 ASSAY OF CALCIUM: CPT

## 2017-07-20 PROCEDURE — 87075 CULTR BACTERIA EXCEPT BLOOD: CPT

## 2017-07-20 PROCEDURE — 96375 TX/PRO/DX INJ NEW DRUG ADDON: CPT

## 2017-07-20 PROCEDURE — 82746 ASSAY OF FOLIC ACID SERUM: CPT

## 2017-07-20 PROCEDURE — 85652 RBC SED RATE AUTOMATED: CPT

## 2017-07-20 PROCEDURE — 85025 COMPLETE CBC W/AUTO DIFF WBC: CPT

## 2017-07-20 PROCEDURE — 83690 ASSAY OF LIPASE: CPT

## 2017-07-20 PROCEDURE — 82570 ASSAY OF URINE CREATININE: CPT

## 2017-07-20 PROCEDURE — 84132 ASSAY OF SERUM POTASSIUM: CPT

## 2017-07-20 PROCEDURE — 82248 BILIRUBIN DIRECT: CPT

## 2017-07-20 PROCEDURE — 87086 URINE CULTURE/COLONY COUNT: CPT

## 2017-07-20 PROCEDURE — 89051 BODY FLUID CELL COUNT: CPT

## 2017-07-20 PROCEDURE — 84295 ASSAY OF SERUM SODIUM: CPT

## 2017-07-20 PROCEDURE — 87040 BLOOD CULTURE FOR BACTERIA: CPT

## 2017-07-20 PROCEDURE — 89060 EXAM SYNOVIAL FLUID CRYSTALS: CPT

## 2017-07-20 PROCEDURE — 80307 DRUG TEST PRSMV CHEM ANLYZR: CPT

## 2017-07-20 PROCEDURE — 82607 VITAMIN B-12: CPT

## 2017-07-20 PROCEDURE — 82550 ASSAY OF CK (CPK): CPT

## 2017-07-20 PROCEDURE — 87449 NOS EACH ORGANISM AG IA: CPT

## 2017-07-20 PROCEDURE — 87324 CLOSTRIDIUM AG IA: CPT

## 2017-07-20 PROCEDURE — 87070 CULTURE OTHR SPECIMN AEROBIC: CPT

## 2017-07-20 PROCEDURE — 94660 CPAP INITIATION&MGMT: CPT

## 2017-07-20 PROCEDURE — 80162 ASSAY OF DIGOXIN TOTAL: CPT

## 2017-07-20 PROCEDURE — 84540 ASSAY OF URINE/UREA-N: CPT

## 2017-07-20 PROCEDURE — 99291 CRITICAL CARE FIRST HOUR: CPT | Mod: 25

## 2017-07-20 RX ORDER — METOPROLOL TARTRATE 50 MG
1 TABLET ORAL
Qty: 0 | Refills: 0 | COMMUNITY
Start: 2017-07-20

## 2017-07-20 RX ORDER — POTASSIUM CHLORIDE 20 MEQ
20 PACKET (EA) ORAL ONCE
Qty: 0 | Refills: 0 | Status: COMPLETED | OUTPATIENT
Start: 2017-07-20 | End: 2017-07-20

## 2017-07-20 RX ORDER — LISINOPRIL 2.5 MG/1
0.5 TABLET ORAL
Qty: 0 | Refills: 0 | COMMUNITY

## 2017-07-20 RX ORDER — RIVAROXABAN 15 MG-20MG
1 KIT ORAL
Qty: 0 | Refills: 0 | COMMUNITY
Start: 2017-07-20

## 2017-07-20 RX ORDER — LISINOPRIL 2.5 MG/1
1 TABLET ORAL
Qty: 0 | Refills: 0 | COMMUNITY
Start: 2017-07-20

## 2017-07-20 RX ORDER — THIAMINE MONONITRATE (VIT B1) 100 MG
1 TABLET ORAL
Qty: 0 | Refills: 0 | COMMUNITY
Start: 2017-07-20

## 2017-07-20 RX ORDER — FUROSEMIDE 40 MG
1 TABLET ORAL
Qty: 0 | Refills: 0 | COMMUNITY

## 2017-07-20 RX ORDER — DIGOXIN 250 MCG
1 TABLET ORAL
Qty: 0 | Refills: 0 | COMMUNITY
Start: 2017-07-20

## 2017-07-20 RX ORDER — FEBUXOSTAT 40 MG/1
1 TABLET ORAL
Qty: 30 | Refills: 0 | OUTPATIENT
Start: 2017-07-20

## 2017-07-20 RX ORDER — FOLIC ACID 0.8 MG
1 TABLET ORAL
Qty: 0 | Refills: 0 | COMMUNITY
Start: 2017-07-20

## 2017-07-20 RX ORDER — FUROSEMIDE 40 MG
1 TABLET ORAL
Qty: 0 | Refills: 0 | COMMUNITY
Start: 2017-07-20

## 2017-07-20 RX ADMIN — Medication 1 MILLIGRAM(S): at 12:29

## 2017-07-20 RX ADMIN — Medication 100 MILLIGRAM(S): at 12:29

## 2017-07-20 RX ADMIN — AMIODARONE HYDROCHLORIDE 200 MILLIGRAM(S): 400 TABLET ORAL at 06:07

## 2017-07-20 RX ADMIN — Medication 20 MILLIEQUIVALENT(S): at 09:56

## 2017-07-20 RX ADMIN — Medication 100 MILLIGRAM(S): at 09:56

## 2017-07-20 RX ADMIN — Medication 0.12 MILLIGRAM(S): at 06:07

## 2017-07-20 RX ADMIN — LISINOPRIL 2.5 MILLIGRAM(S): 2.5 TABLET ORAL at 06:06

## 2017-07-20 RX ADMIN — Medication 1 TABLET(S): at 12:29

## 2017-07-20 RX ADMIN — RIVAROXABAN 15 MILLIGRAM(S): KIT at 12:29

## 2017-07-20 RX ADMIN — Medication 10 MILLIGRAM(S): at 09:56

## 2017-07-20 NOTE — PROGRESS NOTE ADULT - SUBJECTIVE AND OBJECTIVE BOX
No complaints - he is not sure of long term placement  Agrees to follow with Isela Bernal and Armando  Denies CP, SOB, palpitations      MEDICATIONS:  metoprolol 100 milliGRAM(s) Oral two times a day  amiodarone    Tablet 200 milliGRAM(s) Oral two times a day  digoxin     Tablet 0.125 milliGRAM(s) Oral daily  lisinopril 2.5 milliGRAM(s) Oral daily  LORazepam     Tablet 2 milliGRAM(s) Oral every 8 hours PRN  predniSONE   Tablet 10 milliGRAM(s) Oral every 12 hours    rivaroxaban 15 milliGRAM(s) Oral daily  folic acid 1 milliGRAM(s) Oral daily  thiamine 100 milliGRAM(s) Oral daily  multivitamin 1 Tablet(s) Oral daily  sodium chloride 0.9%. 250 milliLiter(s) IV Continuous <Continuous>      EXAM:  VSS are stable                           Appearance: Normal	  HEENT:   Normal oral mucosa, PERRL, EOMI	  Neck: Supple, + JVD/ - JVD; Carotid Bruit   Cardiovascular: Normal S1 S2, No JVD, No murmurs,   Respiratory: Lungs clear to auscultation/Decreased Breath Sounds/No Rales, Rhonchi, Wheezing	  Gastrointestinal:  Soft, Non-tender, + BS	  Skin: No rashes, No ecchymoses, No cyanosis  Extremities: Normal range of motion, No clubbing, cyanosis or edema  Vascular: Peripheral pulses palpable 2+ bilaterally  Neurologic: Non-focal  Psychiatry: A & O x 3, Mood & affect appropriate      	    ECG:  AF with controlled VR	  RADIOLOGY:   DIAGNOSTIC TESTING:  [ ] Echocardiogram:  [ ]  Catheterization:  [ ] Stress Test:    [ ] LAKEISHA  OTHER: 	    LABS:	 	  CARDIAC MARKERS:                         11.9   8.2   )-----------( 276      ( 19 Jul 2017 06:36 )             37.3     07-19    141  |  103  |  53<H>  ----------------------------<  141<H>  4.3   |  24  |  2.20<H>    Ca    9.6      19 Jul 2017 06:36  Mg     2.4     07-19      proBNP:   Lipid Profile:   HgA1c:   TSH:       ASSESSMENT/PLAN: 	    AF with better controlled rates  - on anticoagulation  - BB and Amio for rate control  - Elective cardioversion with Dr. Roberts planned    CM / CHF  - CHF regimen being optimized  - abstinence from alcohol  - outpatient reevaluation (after Rx of CHF) for ICD implantation    Social issues being addressed    Follow Up:  - EP: Dr. Roberts  - Cardio/CHF - Dr. Bernal    Above plans reviewed with Isela Bernal and Armando by phone - follow up arranged  Discussed with [facility] team

## 2017-07-20 NOTE — PROGRESS NOTE ADULT - PROVIDER SPECIALTY LIST ADULT
Cardiology
Electrophysiology
Internal Medicine
Neurology
Neurology
Orthopedics
Orthopedics
Rehab Medicine
Rheumatology
Electrophysiology

## 2017-07-21 LAB
CULTURE RESULTS: SIGNIFICANT CHANGE UP
CULTURE RESULTS: SIGNIFICANT CHANGE UP
SPECIMEN SOURCE: SIGNIFICANT CHANGE UP
SPECIMEN SOURCE: SIGNIFICANT CHANGE UP

## 2017-07-24 DIAGNOSIS — Z91.14 PATIENT'S OTHER NONCOMPLIANCE WITH MEDICATION REGIMEN: ICD-10-CM

## 2017-07-24 DIAGNOSIS — R06.02 SHORTNESS OF BREATH: ICD-10-CM

## 2017-07-24 DIAGNOSIS — M10.9 GOUT, UNSPECIFIED: ICD-10-CM

## 2017-07-24 DIAGNOSIS — E78.5 HYPERLIPIDEMIA, UNSPECIFIED: ICD-10-CM

## 2017-07-24 DIAGNOSIS — E66.9 OBESITY, UNSPECIFIED: ICD-10-CM

## 2017-07-24 DIAGNOSIS — I50.23 ACUTE ON CHRONIC SYSTOLIC (CONGESTIVE) HEART FAILURE: ICD-10-CM

## 2017-07-24 DIAGNOSIS — I08.0 RHEUMATIC DISORDERS OF BOTH MITRAL AND AORTIC VALVES: ICD-10-CM

## 2017-07-24 DIAGNOSIS — R32 UNSPECIFIED URINARY INCONTINENCE: ICD-10-CM

## 2017-07-24 DIAGNOSIS — Z59.0 HOMELESSNESS: ICD-10-CM

## 2017-07-24 DIAGNOSIS — Z99.81 DEPENDENCE ON SUPPLEMENTAL OXYGEN: ICD-10-CM

## 2017-07-24 DIAGNOSIS — I95.1 ORTHOSTATIC HYPOTENSION: ICD-10-CM

## 2017-07-24 DIAGNOSIS — E87.2 ACIDOSIS: ICD-10-CM

## 2017-07-24 DIAGNOSIS — S82.492D: ICD-10-CM

## 2017-07-24 DIAGNOSIS — G62.9 POLYNEUROPATHY, UNSPECIFIED: ICD-10-CM

## 2017-07-24 DIAGNOSIS — G47.30 SLEEP APNEA, UNSPECIFIED: ICD-10-CM

## 2017-07-24 DIAGNOSIS — S82.192D OTHER FRACTURE OF UPPER END OF LEFT TIBIA, SUBSEQUENT ENCOUNTER FOR CLOSED FRACTURE WITH ROUTINE HEALING: ICD-10-CM

## 2017-07-24 DIAGNOSIS — R31.9 HEMATURIA, UNSPECIFIED: ICD-10-CM

## 2017-07-24 DIAGNOSIS — I13.0 HYPERTENSIVE HEART AND CHRONIC KIDNEY DISEASE WITH HEART FAILURE AND STAGE 1 THROUGH STAGE 4 CHRONIC KIDNEY DISEASE, OR UNSPECIFIED CHRONIC KIDNEY DISEASE: ICD-10-CM

## 2017-07-24 DIAGNOSIS — F19.921 OTHER PSYCHOACTIVE SUBSTANCE USE, UNSPECIFIED WITH INTOXICATION WITH DELIRIUM: ICD-10-CM

## 2017-07-24 DIAGNOSIS — M48.00 SPINAL STENOSIS, SITE UNSPECIFIED: ICD-10-CM

## 2017-07-24 DIAGNOSIS — Z87.891 PERSONAL HISTORY OF NICOTINE DEPENDENCE: ICD-10-CM

## 2017-07-24 DIAGNOSIS — R41.0 DISORIENTATION, UNSPECIFIED: ICD-10-CM

## 2017-07-24 DIAGNOSIS — N18.9 CHRONIC KIDNEY DISEASE, UNSPECIFIED: ICD-10-CM

## 2017-07-24 DIAGNOSIS — I48.91 UNSPECIFIED ATRIAL FIBRILLATION: ICD-10-CM

## 2017-07-24 DIAGNOSIS — F10.20 ALCOHOL DEPENDENCE, UNCOMPLICATED: ICD-10-CM

## 2017-07-24 DIAGNOSIS — F41.9 ANXIETY DISORDER, UNSPECIFIED: ICD-10-CM

## 2017-07-24 DIAGNOSIS — Z53.20 PROCEDURE AND TREATMENT NOT CARRIED OUT BECAUSE OF PATIENT'S DECISION FOR UNSPECIFIED REASONS: ICD-10-CM

## 2017-07-24 SDOH — ECONOMIC STABILITY - HOUSING INSECURITY: HOMELESSNESS: Z59.0

## 2017-12-28 ENCOUNTER — OUTPATIENT (OUTPATIENT)
Dept: OUTPATIENT SERVICES | Facility: HOSPITAL | Age: 62
LOS: 1 days | End: 2017-12-28
Payer: COMMERCIAL

## 2017-12-28 DIAGNOSIS — I50.9 HEART FAILURE, UNSPECIFIED: ICD-10-CM

## 2017-12-28 PROCEDURE — 93306 TTE W/DOPPLER COMPLETE: CPT | Mod: 26

## 2017-12-28 PROCEDURE — 93306 TTE W/DOPPLER COMPLETE: CPT

## 2019-03-08 NOTE — DISCHARGE NOTE ADULT - CARE PROVIDER_API CALL
Respirations easy in PACU. Lung sounds diminished at bases, clear throughout. No bleeding from mary carmen area. Patient denies pain and nausea.  Voided 300 cc pale pink urine, no clots. Report to floor.  
Camacho Das), Internal Medicine  [address] [address]  Phone: [phone]  Fax: [phone]    Kristofer Roberts), Cardiac Electrophysiology; Cardiovascular Disease  [address] [place], [place] [address]  Phone: [phone]  Fax: [phone]

## 2020-09-14 NOTE — PROGRESS NOTE ADULT - PROBLEM SELECTOR PROBLEM 7
[EENT/Resp Symptoms] : EENT/RESPIRATORY SYMPTOMS [Runny nose] : runny nose [Intermittent] : intermittent [Dry cough] : dry cough [Sore Throat] : sore throat [Cough] : cough [Sick Contacts: ___] : no sick contacts [Fever] : no fever [Change in sleep] : no change in sleep  [Eye Discharge] : no eye discharge [Chest Pain] : no chest pain [Ear Pain] : no ear pain [Wheezing] : no wheezing [SOB] : no shortness of breath [Decreased Appetite] : no decreased appetite [Vomiting] : no vomiting [Rash] : no rash [FreeTextEntry1] : symptoms began yesterday  [FreeTextEntry3] : did return to school this week Essential hypertension [FreeTextEntry4] : has not tried to treat symptoms; needs to be cleared before return to school

## 2021-01-18 PROBLEM — Z00.00 ENCOUNTER FOR PREVENTIVE HEALTH EXAMINATION: Status: ACTIVE | Noted: 2021-01-18

## 2021-01-29 ENCOUNTER — APPOINTMENT (OUTPATIENT)
Dept: RHEUMATOLOGY | Facility: CLINIC | Age: 66
End: 2021-01-29

## 2021-09-10 NOTE — DISCHARGE NOTE ADULT - CARE PLAN
4.7 Principal Discharge DX:	Acute on chronic systolic (congestive) heart failure  Secondary Diagnosis:	Atrial fibrillation, unspecified type  Secondary Diagnosis:	Alcohol withdrawal  Secondary Diagnosis:	Gout attack  Secondary Diagnosis:	CKD (chronic kidney disease)  Secondary Diagnosis:	Fibula fracture Principal Discharge DX:	Acute on chronic systolic (congestive) heart failure  Instructions for follow-up, activity and diet:	You were admitted to the cardiac telemetry floor for treatment of decompensated heart failure. You were given intravenous lasix to get rid of the fluid around your lungs and body. THis happened likely from discontinuation of your medications. It is very important that you DO NOT stop taking your medications to prevent symptoms and rehospitalization. Weigh yourself daily and report any weight gain over 2 pounds/day to your Doctor. The pumping function of your heart is very low. You will need to evaluated by an internal defibrillator within 3 months if it does not improve. Please follow up with Dr. Roberts.  Secondary Diagnosis:	Atrial fibrillation, unspecified type  Instructions for follow-up, activity and diet:	You have a history of atrial fibrillation. You heart rate was fast during your hospitalization also likely from discontinuation of your medications. Your heart rate was controlled with Digoxin, Amiodarone and Metoprolol; please take them as prescribed without missing doses. Continue to take the blood thinner xarelto to prevent the risk of stroke. Follow up with Dr. Roberts for ongoing monitoring.  Secondary Diagnosis:	Alcohol withdrawal  Instructions for follow-up, activity and diet:	You were monitored closely fro alcohol withdrawal in the hospital and evaluated by Psychiatry. We recommend you to go to rehab with anticipation to stop drinking.  Secondary Diagnosis:	Gout attack  Instructions for follow-up, activity and diet:	You were found to have a gout flare in your right knee during your hospitalization. Please take Prednisone as prescribed.  Secondary Diagnosis:	CKD (chronic kidney disease)  Instructions for follow-up, activity and diet:	You have a history of chronic kidney disease which worsened slightly during your hospitalization. It is likely related to getting rid of too much fluid from your body.  Please have your kidney function checked within 1 week to make sure it is stable.  Secondary Diagnosis:	Fibula fracture  Instructions for follow-up, activity and diet:	Incidental finding of left fibula fracture was noted on your knee xray. You were evaluated by Orthopedics and underwent Knee MRI Principal Discharge DX:	Acute on chronic systolic (congestive) heart failure  Goal:	Follow up with Dr Sheth on 8/3/17 and Dr Roberts on 8/8/17 as scheduled  Instructions for follow-up, activity and diet:	You were admitted to the cardiac telemetry floor for treatment of decompensated congestive heart failure. You were given intravenous Lasix to get rid of the fluid around your lungs and body. This happened likely from discontinuation of your medications. It is very important that you DO NOT stop taking your medications to prevent symptoms and rehospitalization. Weigh yourself daily and report any weight gain over 2 pounds/day to your Doctor. The pumping function of your heart is very low. You will need to reevaluate your heart pumping function in 3 months and may need an internal defibrillator if it does not improve. Please follow up with Dr. Roberts and Dr Sheth as scheduled.  Secondary Diagnosis:	Atrial fibrillation, unspecified type  Instructions for follow-up, activity and diet:	You have a history of atrial fibrillation. You heart rate was fast during your hospitalization also likely from discontinuation of your medications. Your heart rate was controlled with Digoxin, Amiodarone and Metoprolol; please take them as prescribed without missing doses. Continue to take the blood thinner Xarelto to prevent the risk of stroke. Follow up with Dr. Roberts for ongoing monitoring.  Secondary Diagnosis:	Alcohol withdrawal  Instructions for follow-up, activity and diet:	You were monitored closely for alcohol withdrawal in the hospital and evaluated by Psychiatry. We recommend you to go to rehab with anticipation to stop drinking.  Secondary Diagnosis:	Gout attack  Instructions for follow-up, activity and diet:	You were found to have a gout flare up in your right knee during your hospitalization. Please take Prednisone as prescribed.  Secondary Diagnosis:	CKD (chronic kidney disease)  Instructions for follow-up, activity and diet:	You have a history of chronic kidney disease which worsened slightly during your hospitalization. It is likely related to getting rid of too much fluid from your body.  Please have your kidney function checked within 1 week to make sure it is stable.  Secondary Diagnosis:	Fibula fracture  Instructions for follow-up, activity and diet:	Incidental finding of left fibula fracture was noted on your Knee Xray. You were evaluated by Orthopedics and underwent Knee MRI which Principal Discharge DX:	Acute on chronic systolic (congestive) heart failure  Goal:	Follow up with Dr Sheth on 8/3/17 and Dr Roberts on 8/8/17 as scheduled  Instructions for follow-up, activity and diet:	You were admitted to the cardiac telemetry floor for treatment of decompensated congestive heart failure. You were given intravenous Lasix to get rid of the fluid around your lungs and body. This happened likely from discontinuation of your medications. It is very important that you DO NOT stop taking your medications to prevent symptoms and rehospitalization. Weigh yourself daily and report any weight gain over 2 pounds/day to your Doctor. The pumping function of your heart is very low. You will need to reevaluate your heart pumping function in 3 months and may need an internal defibrillator if it does not improve. Please follow up with Dr. Roberts and Dr Sheth as scheduled.  Secondary Diagnosis:	Atrial fibrillation, unspecified type  Instructions for follow-up, activity and diet:	You have a history of atrial fibrillation. You heart rate was fast during your hospitalization also likely from discontinuation of your medications. Your heart rate was controlled with Digoxin, Amiodarone and Metoprolol; please take them as prescribed without missing doses. Continue to take the blood thinner Xarelto to prevent the risk of stroke. Follow up with Dr. Roberts for ongoing monitoring.  Secondary Diagnosis:	Alcohol withdrawal  Instructions for follow-up, activity and diet:	You were monitored closely for alcohol withdrawal in the hospital and evaluated by Psychiatry. We recommend you to go to rehab with anticipation to stop drinking.  Secondary Diagnosis:	Gout attack  Instructions for follow-up, activity and diet:	You were found to have a gout flare up in your right knee during your hospitalization. Please take Prednisone 5mg twice daily for 5 days, then decrease to 1mg daily. You may start Colchicine 0.6mg daily after 1 week on Prednisone and monitor for any recurrent diarrhea. You may stop the Prednisone if you are tolerating the Colchicine without diarrheal side effects. Please stake Uloric 40mg daily to prevent further gout attacks. You will likely need repeat Uric Acid level in 2 weeks.  Secondary Diagnosis:	CKD (chronic kidney disease)  Instructions for follow-up, activity and diet:	You have a history of chronic kidney disease which worsened slightly during your hospitalization. It is likely related to getting rid of too much fluid from your body.  Please have your kidney function checked within 1 week to make sure it is stable.  Secondary Diagnosis:	Fibula fracture  Instructions for follow-up, activity and diet:	Incidental finding of left fibula fracture was noted on your Knee Xray. You were evaluated by Orthopedics and underwent Knee MRI which Principal Discharge DX:	Acute on chronic systolic (congestive) heart failure  Goal:	Follow up with Dr Sheth on 8/3/17 and Dr Roberts on 8/8/17 as scheduled  Instructions for follow-up, activity and diet:	You were admitted to the cardiac telemetry floor for treatment of decompensated congestive heart failure. You were given intravenous Lasix to get rid of the fluid around your lungs and body. This happened likely from discontinuation of your medications. It is very important that you DO NOT stop taking your medications to prevent symptoms and rehospitalization. Weigh yourself daily and report any weight gain over 2 pounds/day to your Doctor. The pumping function of your heart is very low. You will need to reevaluate your heart pumping function in 3 months and may need an internal defibrillator if it does not improve. Please follow up with Dr. Roberts and Dr Sheth as scheduled.  Secondary Diagnosis:	Atrial fibrillation, unspecified type  Instructions for follow-up, activity and diet:	You have a history of atrial fibrillation. You heart rate was fast during your hospitalization also likely from discontinuation of your medications. Your heart rate was controlled with Digoxin, Amiodarone and Metoprolol; please take them as prescribed without missing doses. Continue to take the blood thinner Xarelto to prevent the risk of stroke. Follow up with Dr. Roberts for ongoing monitoring.  Secondary Diagnosis:	Alcohol withdrawal  Instructions for follow-up, activity and diet:	You were monitored closely for alcohol withdrawal in the hospital and evaluated by Psychiatry. We recommend you to go to rehab with anticipation to stop drinking.  Secondary Diagnosis:	Gout attack  Instructions for follow-up, activity and diet:	You were found to have a gout flare up in your right knee during your hospitalization. Please take Prednisone 5mg twice daily for 5 days, then decrease to 1mg daily. You may start Colchicine 0.6mg daily after 1 week on Prednisone and monitor for any recurrent diarrhea. You may stop the Prednisone if you are tolerating the Colchicine without diarrheal side effects. Please start Uloric 40mg daily to prevent further gout attacks. You will likely need repeat Uric Acid level in 2 weeks on Uloric.  Secondary Diagnosis:	CKD (chronic kidney disease)  Instructions for follow-up, activity and diet:	You have a history of chronic kidney disease which worsened slightly during your hospitalization. It is likely related to getting rid of too much fluid from your body.  Please have your kidney function checked within 1 week to make sure it is stable.  Secondary Diagnosis:	Fibula fracture  Instructions for follow-up, activity and diet:	Incidental finding of left fibula fracture was noted on your Knee Xray. You were evaluated by Orthopedics and underwent Knee MRI which showed subacute to chronic left fibular head apex. You were seen by orthopedic surgeons and they recommended no interventions at this time.

## 2023-08-29 NOTE — PROGRESS NOTE BEHAVIORAL HEALTH - [facility] ED [facility] ROS CARDIOVASCULAR
Pt son requesting a call back from . want to discuss the procedure in IR pt is scheduled for.  Wants to see if this is the best route since PET scan was done     Pt son Estefany Schroeder  can be reached at [phone] No complaints

## 2025-03-14 NOTE — PROGRESS NOTE ADULT - PROBLEM SELECTOR PLAN 4
Orders for admission, patient is aware of plan and ready to go upstairs. Any questions, please call ED RN Lila at extension [phone].     Patient Covid vaccination status: Fully vaccinated     COVID Test Ordered in ED: None    COVID Suspicion at Admission: N/A    Running Infusions:  None    Mental Status/LOC at time of transport: alert and oriented x 4     Other pertinent information: ambulatory, calm, cooperative  CIWA score: N/A   NIH score:  N/A         Anxiety and symptoms may be from cardiac issues.   -Continue to monitor CIWA.    Ativan 2mg q4 PRN. Vinh on board  -periods of delirium,   -patient concerned about housing  -patient wants to go to alcohol rehab